# Patient Record
Sex: FEMALE | Race: WHITE | NOT HISPANIC OR LATINO | Employment: FULL TIME | URBAN - METROPOLITAN AREA
[De-identification: names, ages, dates, MRNs, and addresses within clinical notes are randomized per-mention and may not be internally consistent; named-entity substitution may affect disease eponyms.]

---

## 2019-01-23 ENCOUNTER — RECORDS - HEALTHEAST (OUTPATIENT)
Dept: LAB | Facility: CLINIC | Age: 24
End: 2019-01-23

## 2019-01-24 LAB — BACTERIA SPEC CULT: NO GROWTH

## 2019-05-01 ENCOUNTER — RECORDS - HEALTHEAST (OUTPATIENT)
Dept: LAB | Facility: CLINIC | Age: 24
End: 2019-05-01

## 2019-05-02 LAB — C TRACH DNA SPEC QL PROBE+SIG AMP: NEGATIVE

## 2020-11-24 ENCOUNTER — COMMUNICATION - HEALTHEAST (OUTPATIENT)
Dept: TELEHEALTH | Facility: CLINIC | Age: 25
End: 2020-11-24

## 2020-11-24 ENCOUNTER — OFFICE VISIT - HEALTHEAST (OUTPATIENT)
Dept: FAMILY MEDICINE | Facility: CLINIC | Age: 25
End: 2020-11-24

## 2020-11-24 DIAGNOSIS — F41.1 GAD (GENERALIZED ANXIETY DISORDER): ICD-10-CM

## 2020-11-24 DIAGNOSIS — F32.1 MODERATE MAJOR DEPRESSION, SINGLE EPISODE (H): ICD-10-CM

## 2020-11-24 DIAGNOSIS — F51.05 INSOMNIA SECONDARY TO ANXIETY: ICD-10-CM

## 2020-11-24 DIAGNOSIS — R35.0 URINE FREQUENCY: ICD-10-CM

## 2020-11-24 DIAGNOSIS — F41.9 INSOMNIA SECONDARY TO ANXIETY: ICD-10-CM

## 2020-11-24 DIAGNOSIS — Z23 NEEDS FLU SHOT: ICD-10-CM

## 2020-11-24 DIAGNOSIS — K58.2 IRRITABLE BOWEL SYNDROME WITH BOTH CONSTIPATION AND DIARRHEA: ICD-10-CM

## 2020-11-24 DIAGNOSIS — Z00.00 ROUTINE GENERAL MEDICAL EXAMINATION AT A HEALTH CARE FACILITY: ICD-10-CM

## 2020-11-24 DIAGNOSIS — Z13.228 SCREENING FOR METABOLIC DISORDER: ICD-10-CM

## 2020-11-24 DIAGNOSIS — Z12.4 SCREENING FOR CERVICAL CANCER: ICD-10-CM

## 2020-11-24 LAB
ALBUMIN UR-MCNC: NEGATIVE MG/DL
APPEARANCE UR: CLEAR
BACTERIA #/AREA URNS HPF: ABNORMAL HPF
BILIRUB UR QL STRIP: NEGATIVE
CHOLEST SERPL-MCNC: 183 MG/DL
COLOR UR AUTO: YELLOW
FASTING STATUS PATIENT QL REPORTED: YES
GLUCOSE UR STRIP-MCNC: NEGATIVE MG/DL
HBA1C MFR BLD: 5.4 %
HDLC SERPL-MCNC: 51 MG/DL
HGB UR QL STRIP: ABNORMAL
KETONES UR STRIP-MCNC: NEGATIVE MG/DL
LDLC SERPL CALC-MCNC: 106 MG/DL
LEUKOCYTE ESTERASE UR QL STRIP: NEGATIVE
MUCOUS THREADS #/AREA URNS LPF: ABNORMAL LPF
NITRATE UR QL: NEGATIVE
PH UR STRIP: 5.5 [PH] (ref 5–8)
RBC #/AREA URNS AUTO: ABNORMAL HPF
SP GR UR STRIP: >=1.03 (ref 1–1.03)
SQUAMOUS #/AREA URNS AUTO: ABNORMAL LPF
TRIGL SERPL-MCNC: 130 MG/DL
TSH SERPL DL<=0.005 MIU/L-ACNC: 2.88 UIU/ML (ref 0.3–5)
UROBILINOGEN UR STRIP-ACNC: ABNORMAL
WBC #/AREA URNS AUTO: ABNORMAL HPF

## 2020-11-24 RX ORDER — CITALOPRAM HYDROBROMIDE 20 MG/1
20 TABLET ORAL DAILY
Qty: 90 TABLET | Refills: 4 | Status: SHIPPED | OUTPATIENT
Start: 2020-11-24 | End: 2021-08-19

## 2020-11-24 RX ORDER — LEVONORGESTREL AND ETHINYL ESTRADIOL 0.15-0.03
1 KIT ORAL DAILY
Status: SHIPPED | COMMUNITY
Start: 2020-05-15 | End: 2021-07-20

## 2020-11-24 ASSESSMENT — ANXIETY QUESTIONNAIRES
2. NOT BEING ABLE TO STOP OR CONTROL WORRYING: NEARLY EVERY DAY
6. BECOMING EASILY ANNOYED OR IRRITABLE: MORE THAN HALF THE DAYS
1. FEELING NERVOUS, ANXIOUS, OR ON EDGE: NEARLY EVERY DAY
5. BEING SO RESTLESS THAT IT IS HARD TO SIT STILL: NOT AT ALL
IF YOU CHECKED OFF ANY PROBLEMS ON THIS QUESTIONNAIRE, HOW DIFFICULT HAVE THESE PROBLEMS MADE IT FOR YOU TO DO YOUR WORK, TAKE CARE OF THINGS AT HOME, OR GET ALONG WITH OTHER PEOPLE: SOMEWHAT DIFFICULT
GAD7 TOTAL SCORE: 14
4. TROUBLE RELAXING: MORE THAN HALF THE DAYS
7. FEELING AFRAID AS IF SOMETHING AWFUL MIGHT HAPPEN: SEVERAL DAYS
3. WORRYING TOO MUCH ABOUT DIFFERENT THINGS: NEARLY EVERY DAY

## 2020-11-24 ASSESSMENT — MIFFLIN-ST. JEOR: SCORE: 1387.27

## 2020-11-24 ASSESSMENT — PATIENT HEALTH QUESTIONNAIRE - PHQ9: SUM OF ALL RESPONSES TO PHQ QUESTIONS 1-9: 11

## 2020-11-25 LAB
HPV SOURCE: ABNORMAL
HUMAN PAPILLOMA VIRUS 16 DNA: NEGATIVE
HUMAN PAPILLOMA VIRUS 18 DNA: NEGATIVE
HUMAN PAPILLOMA VIRUS FINAL DIAGNOSIS: ABNORMAL
HUMAN PAPILLOMA VIRUS OTHER HR: POSITIVE
SPECIMEN DESCRIPTION: ABNORMAL

## 2020-11-30 ENCOUNTER — COMMUNICATION - HEALTHEAST (OUTPATIENT)
Dept: FAMILY MEDICINE | Facility: CLINIC | Age: 25
End: 2020-11-30

## 2020-12-03 ENCOUNTER — COMMUNICATION - HEALTHEAST (OUTPATIENT)
Dept: FAMILY MEDICINE | Facility: CLINIC | Age: 25
End: 2020-12-03

## 2020-12-03 LAB
BKR LAB AP ABNORMAL BLEEDING: NO
BKR LAB AP BIRTH CONTROL/HORMONES: ABNORMAL
BKR LAB AP CERVICAL APPEARANCE: NORMAL
BKR LAB AP GYN ADEQUACY: ABNORMAL
BKR LAB AP GYN INTERPRETATION: ABNORMAL
BKR LAB AP HPV REFLEX: ABNORMAL
BKR LAB AP LMP: ABNORMAL
BKR LAB AP PATIENT STATUS: ABNORMAL
BKR LAB AP PREVIOUS ABNORMAL: NO
BKR LAB AP PREVIOUS NORMAL: ABNORMAL
HIGH RISK?: NO
INTERPRETING LAB: ABNORMAL
PATH REPORT.COMMENTS IMP SPEC: ABNORMAL
RESULT FLAG (HE HISTORICAL CONVERSION): ABNORMAL

## 2020-12-04 ENCOUNTER — COMMUNICATION - HEALTHEAST (OUTPATIENT)
Dept: INTERNAL MEDICINE | Facility: CLINIC | Age: 25
End: 2020-12-04

## 2020-12-04 DIAGNOSIS — R87.613 HSIL (HIGH GRADE SQUAMOUS INTRAEPITHELIAL LESION) ON PAP SMEAR OF CERVIX: ICD-10-CM

## 2020-12-14 ENCOUNTER — AMBULATORY - HEALTHEAST (OUTPATIENT)
Dept: FAMILY MEDICINE | Facility: CLINIC | Age: 25
End: 2020-12-14

## 2020-12-14 DIAGNOSIS — R87.613 HSIL (HIGH GRADE SQUAMOUS INTRAEPITHELIAL LESION) ON PAP SMEAR OF CERVIX: ICD-10-CM

## 2020-12-15 LAB
LAB AP CHARGES (HE HISTORICAL CONVERSION): NORMAL
PATH REPORT.COMMENTS IMP SPEC: NORMAL
PATH REPORT.COMMENTS IMP SPEC: NORMAL
PATH REPORT.FINAL DX SPEC: NORMAL
PATH REPORT.GROSS SPEC: NORMAL
PATH REPORT.MICROSCOPIC SPEC OTHER STN: NORMAL
PATH REPORT.RELEVANT HX SPEC: NORMAL
RESULT FLAG (HE HISTORICAL CONVERSION): NORMAL

## 2020-12-16 ENCOUNTER — COMMUNICATION - HEALTHEAST (OUTPATIENT)
Dept: FAMILY MEDICINE | Facility: CLINIC | Age: 25
End: 2020-12-16

## 2020-12-17 ENCOUNTER — COMMUNICATION - HEALTHEAST (OUTPATIENT)
Dept: FAMILY MEDICINE | Facility: CLINIC | Age: 25
End: 2020-12-17

## 2020-12-21 ENCOUNTER — COMMUNICATION - HEALTHEAST (OUTPATIENT)
Dept: FAMILY MEDICINE | Facility: CLINIC | Age: 25
End: 2020-12-21

## 2021-01-19 ENCOUNTER — COMMUNICATION - HEALTHEAST (OUTPATIENT)
Dept: FAMILY MEDICINE | Facility: CLINIC | Age: 26
End: 2021-01-19

## 2021-01-19 DIAGNOSIS — F51.05 INSOMNIA SECONDARY TO ANXIETY: ICD-10-CM

## 2021-01-19 DIAGNOSIS — F41.9 INSOMNIA SECONDARY TO ANXIETY: ICD-10-CM

## 2021-01-25 ENCOUNTER — COMMUNICATION - HEALTHEAST (OUTPATIENT)
Dept: OBGYN | Facility: CLINIC | Age: 26
End: 2021-01-25

## 2021-01-25 ENCOUNTER — AMBULATORY - HEALTHEAST (OUTPATIENT)
Dept: OBGYN | Facility: CLINIC | Age: 26
End: 2021-01-25

## 2021-03-02 ENCOUNTER — COMMUNICATION - HEALTHEAST (OUTPATIENT)
Dept: FAMILY MEDICINE | Facility: CLINIC | Age: 26
End: 2021-03-02

## 2021-03-02 DIAGNOSIS — F41.1 GAD (GENERALIZED ANXIETY DISORDER): ICD-10-CM

## 2021-03-10 ENCOUNTER — COMMUNICATION - HEALTHEAST (OUTPATIENT)
Dept: FAMILY MEDICINE | Facility: CLINIC | Age: 26
End: 2021-03-10

## 2021-03-10 DIAGNOSIS — F41.1 GAD (GENERALIZED ANXIETY DISORDER): ICD-10-CM

## 2021-03-10 RX ORDER — BUPROPION HYDROCHLORIDE 150 MG/1
TABLET ORAL
Qty: 90 TABLET | Refills: 2 | Status: SHIPPED | OUTPATIENT
Start: 2021-03-10 | End: 2021-08-24

## 2021-04-07 ENCOUNTER — COMMUNICATION - HEALTHEAST (OUTPATIENT)
Dept: FAMILY MEDICINE | Facility: CLINIC | Age: 26
End: 2021-04-07

## 2021-04-07 DIAGNOSIS — F41.9 INSOMNIA SECONDARY TO ANXIETY: ICD-10-CM

## 2021-04-07 DIAGNOSIS — F51.05 INSOMNIA SECONDARY TO ANXIETY: ICD-10-CM

## 2021-04-08 RX ORDER — TRAZODONE HYDROCHLORIDE 100 MG/1
TABLET ORAL
Qty: 135 TABLET | Refills: 1 | Status: SHIPPED | OUTPATIENT
Start: 2021-04-08 | End: 2021-08-24

## 2021-04-19 ENCOUNTER — OFFICE VISIT - HEALTHEAST (OUTPATIENT)
Dept: FAMILY MEDICINE | Facility: CLINIC | Age: 26
End: 2021-04-19

## 2021-04-19 DIAGNOSIS — F32.1 MODERATE MAJOR DEPRESSION, SINGLE EPISODE (H): ICD-10-CM

## 2021-04-19 DIAGNOSIS — K58.2 IRRITABLE BOWEL SYNDROME WITH BOTH CONSTIPATION AND DIARRHEA: ICD-10-CM

## 2021-04-19 DIAGNOSIS — N32.9 BLADDER PROBLEM: ICD-10-CM

## 2021-04-19 DIAGNOSIS — N39.41 URGENCY INCONTINENCE: ICD-10-CM

## 2021-04-19 DIAGNOSIS — Z98.890 H/O LEEP: ICD-10-CM

## 2021-04-19 LAB
ALBUMIN UR-MCNC: NEGATIVE G/DL
APPEARANCE UR: CLEAR
BILIRUB UR QL STRIP: NEGATIVE
COLOR UR AUTO: YELLOW
GLUCOSE UR STRIP-MCNC: NEGATIVE MG/DL
HGB UR QL STRIP: ABNORMAL
KETONES UR STRIP-MCNC: NEGATIVE MG/DL
LEUKOCYTE ESTERASE UR QL STRIP: NEGATIVE
NITRATE UR QL: NEGATIVE
PH UR STRIP: 6 [PH] (ref 5–8)
SP GR UR STRIP: 1.02 (ref 1–1.03)
UROBILINOGEN UR STRIP-ACNC: ABNORMAL

## 2021-05-27 ASSESSMENT — PATIENT HEALTH QUESTIONNAIRE - PHQ9: SUM OF ALL RESPONSES TO PHQ QUESTIONS 1-9: 11

## 2021-05-28 ASSESSMENT — ANXIETY QUESTIONNAIRES: GAD7 TOTAL SCORE: 14

## 2021-06-05 VITALS
WEIGHT: 135.38 LBS | BODY MASS INDEX: 21.85 KG/M2 | OXYGEN SATURATION: 100 % | DIASTOLIC BLOOD PRESSURE: 80 MMHG | SYSTOLIC BLOOD PRESSURE: 116 MMHG | HEART RATE: 62 BPM

## 2021-06-05 VITALS
HEART RATE: 72 BPM | BODY MASS INDEX: 21.98 KG/M2 | SYSTOLIC BLOOD PRESSURE: 112 MMHG | DIASTOLIC BLOOD PRESSURE: 78 MMHG | WEIGHT: 136.8 LBS | HEIGHT: 66 IN

## 2021-06-05 VITALS
WEIGHT: 138.4 LBS | DIASTOLIC BLOOD PRESSURE: 68 MMHG | SYSTOLIC BLOOD PRESSURE: 108 MMHG | BODY MASS INDEX: 22.34 KG/M2 | HEART RATE: 76 BPM

## 2021-06-10 ENCOUNTER — RECORDS - HEALTHEAST (OUTPATIENT)
Dept: ADMINISTRATIVE | Facility: OTHER | Age: 26
End: 2021-06-10

## 2021-06-10 LAB
ALT SERPL W/O P-5'-P-CCNC: 16 U/L (ref 0–78)
AST SERPL-CCNC: 15 U/L (ref 0–37)
CREAT SERPL-MCNC: 1.06 MG/DL (ref 0.6–1.02)

## 2021-06-13 NOTE — TELEPHONE ENCOUNTER
Pap RN to call pt today to go over results. HSIL, +HR HPV (not 16/18). Colposcopy is recommended.      Palak Ayon RN BSN, Pap Tracking

## 2021-06-13 NOTE — PROGRESS NOTES
Colposcopy Procedure Note    Keely Cox is a 25 y.o. female is here today for a Colposcopy.    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 The prior pap showed no abnormalities.  Prior cervical/vaginal disease: HPV related changes. Prior cervical treatment: no treatment.    Procedure Details   The reason for procedure is explained and informed consent obtained.    Speculum placed in vagina and visualization of cervix achieved, cervix swabbed with 3% acetic acid solution. Cervix is also swabbed with Lugol's solution. Procedure detailsCervix swabbed with Lugol's solution, Endocervical curettage performed, Endometrial biopsy performed, Cervical biopsies taken at 10 and 3 o'clock, Hemostasis achieved with Monsel's solution and Hemostasis achieved with silver nitrate    Findings:  Cervix: acetowhite lesion(s) noted at 3-10 o'clock, punctation noted at  More at 10 O clock, abnormal vessels noted at 10 o'clock, HPV changes noted at 3-10 o'clock and SCJ visualized - lesion at 3-10 o'clock;       Specimens: See orders    Complications: bleeding. Able to stop with silver nitrate    Plan:  Specimens labelled and sent to Pathology. Role of HPV in causing cervical pap smear abnormalities, dysplasia, and cancer is reviewed with the patient. She will be contacted in a few days with biopsy results and recommendations.    Gris Deras

## 2021-06-13 NOTE — PATIENT INSTRUCTIONS - HE
Dear Hope,    Pleasure to meet you today at the clinic visit  As your depression is not well controlled and we are taking over the medication I have few suggestions which might be little more helpful we will continue citalopram at 20 mg dose but added a Wellbutrin 150 mg once daily to start with but can gradually be moved to twice daily dosing you can still use your trazodone and BuSpar as needed  Continue mental health therapy as before  We will follow-up on your labs through my chart as soon as you login  Also we had little confusion about your urine culture result through your urologist if you can find out the name of the pathogen I would like to know and put it in your chart  Continue work with a GI specialist for your irritable bowel syndrome    Gris Deras MD 11/24/2020 10:30 AM

## 2021-06-14 NOTE — TELEPHONE ENCOUNTER
Patient calling requesting refill of her trazodone. Patient last seen 11/24/2020 to Establish care and for a physical.   Refill pended. Please advise.   Ann Mas LPN

## 2021-06-14 NOTE — TELEPHONE ENCOUNTER
1-19-21  Hope called in stated her refill for :  traZODone (DESYREL) 100 MG tablet  Has to go to [facility] not [facility]  can this be called into [facility] instead.  david

## 2021-06-14 NOTE — PROGRESS NOTES
As patient is concerned about the implications of future pregnancy, will keep chart updated for colp/cotest in 1 year. Will also send patient a message stating she can proceed with treatment/LEEP this year if she desires this.    Palak Ayon RN BSN, Pap Tracking

## 2021-06-14 NOTE — PROGRESS NOTES
Hi Dr Deras,    Patient saw you for a colposcopy on 12/14/20 following HSIL pap. Pathology showed LANA I, II, and III. I would like to verify with you that your follow up recommendation is colposcopy with cotesting in 1 year rather than treatment now. ASCCP guidelines recommend LEEP treatment for LANA III. Thank you for clarifying!    Palak Ayon RN BSN, Pap Tracking

## 2021-06-15 NOTE — TELEPHONE ENCOUNTER
Refill Approved    Rx renewed per Medication Renewal Policy. Medication was last renewed on 11/24/20.    [place], [facility] [place] Connection Triage/Med Refill 3/3/2021     Requested Prescriptions   Pending Prescriptions Disp Refills     buPROPion (WELLBUTRIN XL) 150 MG 24 hr tablet [Pharmacy Med Name: BUPROPION XL 150MG TABLETS (24 H)] 30 tablet 2     Sig: TAKE 1 TABLET(150 MG) BY MOUTH EVERY MORNING       Tricyclics/Misc Antidepressant/Antianxiety Meds Refill Protocol Passed - 3/2/2021 12:28 PM        Passed - PCP or prescribing provider visit in last year     Last office visit with prescriber/PCP: 11/24/2020 Gris Deras MD OR same dept: 11/24/2020 Gris Deras MD OR same specialty: 11/24/2020 Gris Dreas MD  Last physical: Visit date not found Last MTM visit: Visit date not found   Next visit within 3 mo: Visit date not found  Next physical within 3 mo: Visit date not found  Prescriber OR PCP: Gris Deras MD  Last diagnosis associated with med order: 1. SUMANTH (generalized anxiety disorder)  - buPROPion (WELLBUTRIN XL) 150 MG 24 hr tablet [Pharmacy Med Name: BUPROPION XL 150MG TABLETS (24 H)]; TAKE 1 TABLET(150 MG) BY MOUTH EVERY MORNING  Dispense: 30 tablet; Refill: 2    If protocol passes may refill for 12 months if within 3 months of last provider visit (or a total of 15 months).

## 2021-06-15 NOTE — TELEPHONE ENCOUNTER
Hope called in stating that she has been out of her wellbutrin for almost a week. She stated she needs her wellbutrin sent into her mail order pharmacy and not [facility] anymore. She would like this asap. Patient last seen 11/24/2020 for annual exam and to establish care.  Ann Mas LPN

## 2021-06-16 PROBLEM — K58.2 IRRITABLE BOWEL SYNDROME WITH BOTH CONSTIPATION AND DIARRHEA: Status: ACTIVE | Noted: 2017-01-05

## 2021-06-16 PROBLEM — Z78.9 VEGETARIAN: Status: ACTIVE | Noted: 2020-11-24

## 2021-06-16 PROBLEM — F32.1 MODERATE MAJOR DEPRESSION, SINGLE EPISODE (H): Status: ACTIVE | Noted: 2020-03-06

## 2021-06-16 PROBLEM — Z79.899 MEDICATION MANAGEMENT: Status: ACTIVE | Noted: 2017-01-05

## 2021-06-16 PROBLEM — Z98.890 H/O LEEP: Status: ACTIVE | Noted: 2021-04-19

## 2021-06-16 PROBLEM — D68.00 VON WILLEBRAND DISEASE (H): Status: ACTIVE | Noted: 2020-03-06

## 2021-06-16 PROBLEM — F41.9 ANXIETY: Status: ACTIVE | Noted: 2020-03-06

## 2021-06-16 PROBLEM — E78.49 OTHER HYPERLIPIDEMIA: Status: ACTIVE | Noted: 2018-05-31

## 2021-06-16 NOTE — PATIENT INSTRUCTIONS - HE
oregano oil mix in water every day with straw.  Ice therpay  Will restart recommend physical therapy /biofeed back    Gris Deras MD 4/19/2021 8:59 AM

## 2021-06-16 NOTE — TELEPHONE ENCOUNTER
Refill Approved    Rx renewed per Medication Renewal Policy. Medication was last renewed on 1/19/21, last OV .11/24/20  [facility] [place] Triage/Med Refill 4/8/2021     Requested Prescriptions   Pending Prescriptions Disp Refills     traZODone (DESYREL) 100 MG tablet [Pharmacy Med Name: TRAZODONE  100MG  TAB] 135 tablet 3     Sig: TAKE 1 TO 1 AND 1/2 TABLETS BY MOUTH AT BEDTIME AS  NEEDED       Tricyclics/Misc Antidepressant/Antianxiety Meds Refill Protocol Passed - 4/7/2021  8:38 PM        Passed - PCP or prescribing provider visit in last year     Last office visit with prescriber/PCP: 11/24/2020 Gris Deras MD OR same dept: 11/24/2020 Gris Deras MD OR same specialty: 11/24/2020 Gris Deras MD  Last physical: Visit date not found Last MTM visit: Visit date not found   Next visit within 3 mo: Visit date not found  Next physical within 3 mo: Visit date not found  Prescriber OR PCP: Gris Deras MD  Last diagnosis associated with med order: 1. Insomnia secondary to anxiety  - traZODone (DESYREL) 100 MG tablet [Pharmacy Med Name: TRAZODONE  100MG  TAB]; TAKE 1 TO 1 AND 1/2 TABLETS BY MOUTH AT BEDTIME AS  NEEDED  Dispense: 135 tablet; Refill: 3    If protocol passes may refill for 12 months if within 3 months of last provider visit (or a total of 15 months).

## 2021-06-16 NOTE — PROGRESS NOTES
Assessment/plan   Keely Cox is a 25 y.o. female who is  establish patient to my practice here with   Chief Complaint   Patient presents with     Abdominal Pain     left lower pain going on for years, feels a heaviness in bladder and makes her urinate often. has gone to other doctors and not one has figured out what is going on        Keely was seen today for abdominal pain.    Diagnoses and all orders for this visit:    Moderate major depression, single episode (H)    Irritable bowel syndrome with both constipation and diarrhea  With discussed in great length mind-body connection which could be related to all the symptoms , help understand that if in anxiety depression not well controlled you continue to have some kind of IBS symptoms with abdominal discomfort and pain.  Work on more finding purpose in , recommend some podcast related to mental health wellbeing one of them on purpose which we discussed today also some natural therapy which can be used to help alleviate some of the abdominal discomfort  Advised to restart physical therapy to get deeper into the problem and understanding so she will have less fear when symptom comes.  Recommend daily fiber supplement also work on FODMAP diet if she can    bladder problem  -     Urinalysis-UC if Indicated    Urgency incontinence  Comments:  already seen GYN/GI/pelvic  .  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  Plan repeat diagnostic Pap smear January 2022          Patient Instructions   oregano oil mix in water every day with straw.  Ice therpay  Will restart recommend physical therapy /biofeed back    Gris  MD [place] 4/19/2021 8:59 AM      Subjective:      HPI: Keely Cox is a 25 y.o. female is here for    IBS/Abdominal Pain.SUMANTH/depression : Patient with known history of depression anxiety currently treated with citalopram and Wellbutrin.  Patient had extensive work-up with the urologist/gynecologist/GI specialist for her symptoms a lot of symptoms are related to her IBS with quite frequent cycling of constipation with diarrhea.  Patient currently work as a  at [employer] quarter, lives with her boyfriend recently had a LEEP procedure done for HGSIL with positive HPV.  Patient biggest concern is always something related to abdominal discomfort today she is more concerned about left lower quadrant discomfort pain had pelvic  work with her and also had a myofascial release which seems to help with her symptoms temporarily.  No change in bowel habits no blood in the urine or stool currently on cycle  I do not have any records of her urologist or gynecologist evaluation looks like she did had a cystoscopy and ultrasound done last year so I will wait on ordering any further lab tests or imaging till we get all the records    I have personally reviewed the patient's allergies, medications, past medical history, family history, social history, rooming notes and problem list in detail and updated the patient record as necessary.      Past Medical History:   Diagnosis Date     HSIL (high grade squamous intraepithelial lesion) on Pap smear of cervix 11/24/2020 6/5/18 NIL pap 11/24/20 HSIL, +HR HPV (not 16/18). Plan: colposcopy due before 2/24/21     No past surgical history on file.  Other environmental allergy, Pollen extracts, and Penicillins  Current Outpatient Medications   Medication Sig Dispense Refill     buPROPion (WELLBUTRIN XL) 150 MG 24 hr tablet TAKE 1 TABLET(150 MG) BY MOUTH EVERY MORNING 90 tablet 2     CHOLECALCIFEROL, VITAMIN D3, ORAL Take by mouth daily. Unknown  dose       citalopram (CELEXA) 20 MG tablet Take 1 tablet (20 mg total) by mouth daily. 90 tablet 4     FA/mv,Ca,iron,min/lycopene/lut (MULTIVITAL ORAL) Take by mouth.       levonorgestrel-ethinyl estradiol (SEASONALE) 0.15 mg-30 mcg (91) per tablet Take 1 tablet by mouth daily.       traZODone (DESYREL) 100 MG tablet TAKE 1 TO 1 AND 1/2 TABLETS BY MOUTH AT BEDTIME AS  NEEDED 135 tablet 1     No current facility-administered medications for this visit.      No family history on file.    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     This note has been dictated using voice recognition software. Any grammatical or context distortions are unintentional and inherent to the software  Gris Deras MD

## 2021-06-18 ENCOUNTER — RECORDS - HEALTHEAST (OUTPATIENT)
Dept: HEALTH INFORMATION MANAGEMENT | Facility: CLINIC | Age: 26
End: 2021-06-18

## 2021-06-18 NOTE — PATIENT INSTRUCTIONS - HE
Patient Instructions by Gris Deras MD at 12/14/2020  9:00 AM     Author: Gris Deras MD Service: -- Author Type: Physician    Filed: 12/14/2020  9:47 AM Encounter Date: 12/14/2020 Status: Signed    : Gris Deras MD (Physician)         Colposcopy   Colposcopy is a procedure that gives your health care provider a magnified view of the cervix. It is done using a lighted microscope called a colposcope. In most cases, a sample of cervical cells is taken during a biopsy. The sample can then be studied in a lab. If any problems are found, you and your health care provider will discuss treatment options. It usually takes less thanÂ 30 minutes, and you can often go back to your normal routine right away.   Reasons for the Procedure   Colposcopy is usually done as a follow-up exam to help find the cause of an abnormal Pap test. Abnormal Pap tests are often due to an HPV (human papilloma virus) infection. HPV is a large family of viruses. HPV can be passed from person to person through sex. HPVÂ can cause genital warts. It can also cause changes in cervical cells. Colposcopy is also used to assess other problems. These include pain or bleeding during sexual intercourse, or a lesion on the vulva or vagina.       What Are the Risks?   Problems after colposcopy are very rare, but can include:   Bleeding (if a biopsy is done)   Infection    Getting Ready for the Procedure   Colposcopy is normally done in your health care providers office. It will be scheduled for a time when youre not having your menstrual period. You may be asked to sign a form giving your consent to have the procedure. A day or two before the procedure, your health care provider may also ask you to:   Avoid sexual intercourse.   Stop using tampons.   Avoid using creams or other vaginal medications.   Avoid douching.   Take over-the-counter pain medications an hour or two before the procedure.    During Colposcopy   You will be asked to lie on an  exam table with your knees bent, just as you do for a Pap test.   An instrument called a speculum is inserted into the vagina to hold it open.   A vinegar solution is applied to the cervix to make the abnormal cells easier to see. You may feel pressure or a slight burning for a few moments. In some cases, the cervix may be numbed first with an anesthetic.   The cervix is viewed through the colposcope, which is placed outside the vagina.   If your health care provider sees abnormal areas on the cervix, a biopsy will be done. The tissue sample is sent to a lab for study.   An endocervical curettage may also be done at the time of colposcopy. In this procedure, an instrument is put into the endocervical canal to get a sample of cells from the endocervix. This area can't be seen with a colposcope.Â   You may feel slight pinching or cramping during the biopsy. Medication may be applied to the biopsy site to stop bleeding.  After the Procedure   If you feel lightheaded or dizzy, you can rest on the table until youre ready to get dressed.   If a biopsy was done, you may have mild cramping or light bleeding for a few days. You may also have discharge from the medication used to stop bleeding at the biopsy site.   Use pads, not tampons, for at least the first 24 hours.   If you have any discomfort, over-the-counter pain medication can provide relief.   Ask your health care provider when you can resume sexual intercourse.  Follow-Up   If a biopsy was done, your health care provider will get the lab report in a week or 2. You and your health care provider can then discuss the results. In some cases, you may be scheduled for further tests or treatment. Be sure to keep follow-up appointments with your health care provider.       Call your health care provider if you have:   Heavy vaginal bleeding (more than a pad an hour for 2 hours).   Severe or increasing pelvic pain.   A fever overÂ 100.4Â FÂ (38Â C)   Foul-smelling or  unusual vaginal discharge.

## 2021-06-18 NOTE — PATIENT INSTRUCTIONS - HE
Patient Instructions by Gris Deras MD at 12/4/2020  8:51 AM     Author: Gris Deras MD Service: -- Author Type: Physician    Filed: 12/4/2020  8:51 AM Encounter Date: 12/3/2020 Status: Addendum    : Gris Deras MD (Physician)    Related Notes: Original Note by Gris Deras MD (Physician) filed at 12/4/2020  8:51 AM         Patient Education     Colposcopy  Colposcopy is a procedure that gives your healthcare provider a magnified view of your cervix. It is done using a lighted microscope called a colposcope. In most cases, a sample of cervical cells is taken during a biopsy. The sample can then be studied in a lab. If any problems are found, you and your healthcare provider will discuss treatment options. It usually takes less than 30 minutes, and you can often go back to your normal routine right away.  Reasons for the procedure  Colposcopy is usually done as a follow-up exam to help find the cause of an abnormal Pap test. Results of an abnormal Pap test can mean that the cells dont appear normal or that there are cancer cells. Abnormal cells can also be caused by infections. HPV (human papillomavirus) is a large family of viruses that can be passed from person to person through sex. HPV can cause genital warts. It can also cause changes in cervical cells. If an HPV test is positive and the Pap test is abnormal, a colposcopy may be recommended. Colposcopy is also used to evaluate other problems. These include pain or bleeding during sex, or a sore (lesion) on the vulva or vagina.  What are the risks?  Problems after colposcopy are very rare, but can include:    Bleeding (if a biopsy is done)    Infection  Getting ready for the procedure  Colposcopy is normally done in your healthcare providers office. It will be scheduled for a time when youre not having your menstrual period. You may be asked to sign a form giving your consent to have the procedure. A day or 2 before the procedure, your healthcare  provider may also ask you to:    Not have sex    Stop using tampons    Not use creams or other vaginal medicines    Not clean out the vagina with water or other fluids (douche)    Take over-the-counter pain medicines an hour or 2 before the procedure  During colposcopy    You will be asked to lie on an exam table with your knees bent, just as you do for a Pap test.    A tool called a speculum is inserted into the vagina to hold it open.    A vinegar solution is applied to the cervix to make the abnormal cells easier to see. You may feel pressure or a slight burning for a few moments. In some cases, the cervix may be numbed first with an anesthetic.    The cervix is looked at through the colposcope, which is placed outside the vagina.    If your healthcare provider sees abnormal areas on your cervix, a biopsy will be done. The tissue sample is sent to a lab for study.    An endocervical curettage may also be done at the time of colposcopy. In this procedure, a tool is put into the endocervical canal to get a sample of cells from the endocervix. This area can't be seen with a colposcope.     You may feel slight pinching or cramping during the biopsy. Medicine may be applied to the biopsy site to stop bleeding.    After the procedure    If you feel lightheaded or dizzy, you can rest on the table until youre ready to get dressed.    If a biopsy were done, you may have mild cramping or light bleeding for a few days. You may also have discharge from the medicine used to stop bleeding at the biopsy site.    Use pads, not tampons, for at least the first 24 hours.    If you have any discomfort, over-the-counter pain medicine can provide relief.    Ask your healthcare provider when you can have sex again.    Follow-up  If a biopsy were done, your healthcare provider will get the lab report in a week or 2. You and your healthcare provider can then discuss the results. In some cases, you may be scheduled for more tests or  treatment. Be sure to keep follow-up appointments with your healthcare provider.  Call your healthcare provider if you have:    Heavy vaginal bleeding (more than a pad an hour for 2 hours)    Severe or increasing pelvic pain    A fever over 100.4 F (38 C), or as directed by your provider    Bad-smelling or unusual vaginal discharge  Date Last Reviewed: 6/1/2017 2000-2019 [employer]. [address] [place] [address]. All rights reserved. This information is not intended as a substitute for professional medical care. Always follow your healthcare professional's instructions.           Patient Education     Colposcopy  Colposcopy is a procedure that gives your healthcare provider a magnified view of your cervix. It is done using a lighted microscope called a colposcope. In most cases, a sample of cervical cells is taken during a biopsy. The sample can then be studied in a lab. If any problems are found, you and your healthcare provider will discuss treatment options. It usually takes less than 30 minutes, and you can often go back to your normal routine right away.  Reasons for the procedure  Colposcopy is usually done as a follow-up exam to help find the cause of an abnormal Pap test. Results of an abnormal Pap test can mean that the cells dont appear normal or that there are cancer cells. Abnormal cells can also be caused by infections. HPV (human papillomavirus) is a large family of viruses that can be passed from person to person through sex. HPV can cause genital warts. It can also cause changes in cervical cells. If an HPV test is positive and the Pap test is abnormal, a colposcopy may be recommended. Colposcopy is also used to evaluate other problems. These include pain or bleeding during sex, or a sore (lesion) on the vulva or vagina.  What are the risks?  Problems after colposcopy are very rare, but can include:    Bleeding (if a biopsy is done)    Infection  Getting ready for the  procedure  Colposcopy is normally done in your healthcare providers office. It will be scheduled for a time when youre not having your menstrual period. You may be asked to sign a form giving your consent to have the procedure. A day or 2 before the procedure, your healthcare provider may also ask you to:    Not have sex    Stop using tampons    Not use creams or other vaginal medicines    Not clean out the vagina with water or other fluids (douche)    Take over-the-counter pain medicines an hour or 2 before the procedure  During colposcopy    You will be asked to lie on an exam table with your knees bent, just as you do for a Pap test.    A tool called a speculum is inserted into the vagina to hold it open.    A vinegar solution is applied to the cervix to make the abnormal cells easier to see. You may feel pressure or a slight burning for a few moments. In some cases, the cervix may be numbed first with an anesthetic.    The cervix is looked at through the colposcope, which is placed outside the vagina.    If your healthcare provider sees abnormal areas on your cervix, a biopsy will be done. The tissue sample is sent to a lab for study.    An endocervical curettage may also be done at the time of colposcopy. In this procedure, a tool is put into the endocervical canal to get a sample of cells from the endocervix. This area can't be seen with a colposcope.     You may feel slight pinching or cramping during the biopsy. Medicine may be applied to the biopsy site to stop bleeding.    After the procedure    If you feel lightheaded or dizzy, you can rest on the table until youre ready to get dressed.    If a biopsy were done, you may have mild cramping or light bleeding for a few days. You may also have discharge from the medicine used to stop bleeding at the biopsy site.    Use pads, not tampons, for at least the first 24 hours.    If you have any discomfort, over-the-counter pain medicine can provide relief.    Ask  your healthcare provider when you can have sex again.    Follow-up  If a biopsy were done, your healthcare provider will get the lab report in a week or 2. You and your healthcare provider can then discuss the results. In some cases, you may be scheduled for more tests or treatment. Be sure to keep follow-up appointments with your healthcare provider.  Call your healthcare provider if you have:    Heavy vaginal bleeding (more than a pad an hour for 2 hours)    Severe or increasing pelvic pain    A fever over 100.4 F (38 C), or as directed by your provider    Bad-smelling or unusual vaginal discharge  Date Last Reviewed: 6/1/2017 2000-2019 [employer]. [address] [place] [address], [place] [address]. All rights reserved. This information is not intended as a substitute for professional medical care. Always follow your healthcare professional's instructions.

## 2021-06-21 NOTE — LETTER
Letter by Gris Deras MD at      Author: Gris Deras MD Service: -- Author Type: --    Filed:  Encounter Date: 11/24/2020 Status: (Other)                    My Depression Action Plan  Name: eKely Cox   Date of Birth 1995  Date: 11/24/2020    My Doctor: Provider, No Primary Care   My Clinic: [id number]  [phone]          GREEN    ZONE   Good Control    What it looks like:     Things are going generally well. You have normal ups and downs. You may even feel depressed from time to time, but bad moods usually last less than a day.   What you need to do:  1. Continue to care for yourself (see self care plan)  2. Check your depression survival kit and update it as needed  3. Follow your physicians recommendations including any medication.  4. Do not stop taking medication unless you consult with your physician first.           YELLOW         ZONE Getting Worse    What it looks like:     Depression is starting to interfere with your life.     It may be hard to get out of bed; you may be starting to isolate yourself from others.    Symptoms of depression are starting to last most all day and this has happened for several days.     You may have suicidal thoughts but they are not constant.   What you need to do:     1. Call your care team. Your response to treatment will improve if you keep your care team informed of your progress. Yellow periods are signs an adjustment may need to be made.     2. Continue your self-care.  Just get dressed and ready for the day.  Don't give yourself time to talk yourself out of it.    3. Talk to someone in your support network.    4. Open up your depression Depression Self-Care Plan / Wellness kit.           RED    ZONE Medical Alert - Get Help    What it looks like:     Depression is seriously interfering with your life.     You may experience these or other symptoms: You cant get out of bed most days,  cant work or engage in other necessary activities, you have trouble taking care of basic hygiene, or basic responsibilities, thoughts of suicide or death that will not go away, self-injurious behavior.     What you need to do:  1. Call your care team and request a same-day appointment. If they are not available (weekends or after hours) call your local crisis line, emergency room or 911.            Self-Care Plan / Wellness Kit    Self-Care for Depression  Heres the deal. Your body and mind are really not as separate as most people think.  What you do and think affects how you feel and how you feel influences what you do and think. This means if you do things that people who feel good do, it will help you feel better.  Sometimes this is all it takes.  There is also a place for medication and therapy depending on how severe your depression is, so be sure to consult with your medical provider and/ or Behavioral Health Consultant if your symptoms are worsening or not improving.     In order to better manage my stress, I will:    Exercise  Get some form of exercise, every day. This will help reduce pain and release endorphins, the feel good chemicals in your brain. This is almost as good as taking antidepressants!  This is not the same as joining a gym and then never going! (they count on that by the way?) It can be as simple as just going for a walk or doing some gardening, anything that will get you moving.      Hygiene   Maintain good hygiene (get out of bed in the morning, make your bed, brush your teeth, take a shower, and get dressed like you were going to work, even if you are unemployed).  If your clothes don't fit try to get ones that do.    Diet  Strive to eat foods that are good for me, drink plenty of water, and avoid excessive sugar, caffeine, alcohol, and other mood-altering substances.  Some foods that are helpful in depression are: complex carbohydrates, B vitamins, flaxseed, fish or fish oil, fresh  fruits and vegetables.    Psychotherapy  Agree to participate in Individual Therapy (if recommended).    Medication  If prescribed medications, I agree to take them.  Missing doses can result in serious side effects.  I understand that drinking alcohol, or other illicit drug use, may cause potential side effects.  I will not stop my medication abruptly without first discussing it with my provider.    Staying Connected With Others  Stay in touch with my friends, family members, and my primary care provider/team.    Use your imagination  Be creative.  We all have a creative side; it doesnt matter if its oil painting, sand castles, or mud pies! This will also kick up the endorphins.    Witness Beauty  (AKA stop and smell the roses) Take a look outside, even in mid-winter. Notice colors, textures. Watch the squirrels and birds.     Service to others  Be of service to others.  There is always someone else in need.  By helping others we can get out of ourselves and remember the really important things.  This also provides opportunities for practicing all the other parts of the program.    Humor  Laugh and be silly!  Adjust your TV habits for less news and crime-drama and more comedy.    Control your stress  Try breathing deep, massage therapy, biofeedback, and meditation. Find time to relax each day.     Crisis Text Line  http://www.crisistextline.org    The Crisis Text Line serves anyone, in any type of crisis, providing access to free, 24/7 support and information via the medium people already use and trust:    Here's how it works:  1.  Text 514-545 from anywhere in the [place], anytime, about any type of crisis.  2.  A live, trained Crisis Counselor receives the text and responds quickly.  3.  The volunteer Crisis Counselor will help you move from a 'hot moment to a cool moment'.  My support system    Clinic Contact:  Phone number:    Contact 1:  Phone number:    Contact 2:  Phone number:    [employer]/:   Phone number:    Therapist:  Phone number:    [facility] crisis center:    Phone number:    Other community support:  Phone number:

## 2021-06-21 NOTE — LETTER
Letter by Gris Deras MD at      Author: Gris Deras MD Service: -- Author Type: --    Filed:  Encounter Date: 12/16/2020 Status: (Other)         Keely Cox  [address] [address]  [address]  [place] [address]             December 16, 2020         Dear Ms. Cox,    Below are the results from your recent visit:    Resulted Orders   Surgical Pathology   Result Value Ref Range    Case Report       Surgical Pathology Report                         Case: [id number]                                    Authorizing Provider:  Gris Deras MD           Collected:           12/14/2020 1007              Ordering Location:     [facility]   Received:            12/14/2020 1007                                     [facility]                                                            Pathologist:           Genaro Truong MD                                                      Specimens:   A) - Endocervical Curettings                                                                        B) - Cervix @ 03:00, @0300 per container                                                            C) - Cervix @ 10:00, @1000 per container                                                   Final Diagnosis       A) ENDOCERVICAL CURETTINGS:        - NORMAL ENDOCERVICAL EPITHELIUM AND STROMA    B) CERVICAL BIOPSY AT 3 O'CLOCK:        - HGSIL (MODERATE TO SEVERE DYSPLASIA, LANA II-III)        - FOCAL KOILOCYTOSIS, CONSISTENT WITH HPV CYTOPATHIC EFFECT        - NEGATIVE FOR INVASIVE MALIGNANCY         - MODERATE ACUTE AND CHRONIC CERVICITIS     C) CERVICAL BIOPSY AT 10 O'CLOCK:        - LGSIL (MILD DYSPLASIA, LANA I) WITH KOILOCYTOSIS, CONSISTENT WITH HPV CYTOPATHIC EFFECT        - NEGATIVE FOR HIGH GRADE EPITHELIAL DYSPLASIA         - MILD TO MODERATE CHRONIC CERVICITIS    Comment       HISTOLOGY-CYTOLOGY CORRELATION:  Cervical biopsy at 3 o'clock correlates with Pap smear [id number].    Microscopic Description        "Microscopic examination performed, substantiating the above diagnosis.    Clinical Information       Clinical history: HGSIL  Reason for procedure: Abnormal Pap    Gross Description       A) The specimen is received in formalin, labeled with the patient's name and \"ECC,\" and consists of a less than 1-gram aggregate of floccular particles of reddish-tan tissue and mucus clinging to an endocervical brush. The tissue is scraped free of the brush, filtered, and totally submitted.    B) The specimen is received in formalin, labeled with the patient's name and \"3:00,\" and consists of two ovoid fragments of soft and translucent gray tissue, each 4 mm in greatest dimension. The tissue is totally submitted.    C) The specimen is received in formalin, labeled with the patient's name and \"10:00,\" and consists of a collection of irregular fragments of translucent pink-tan tissue, individually measuring up to 3 mm in greatest dimension. The specimen is filtered, and all tissue is submitted. JPL:[id number]    Charges CPT: 88108 ×3  ICD-10: N87.1     Result Flag        Comment:      SPECIMEN PROCESSING:    All histology slide preparation and stains; and cytology slide preparation, staining, and cytotechnologist screening done at [facility] are performed at [facility], [address] [place] [address], [facility], with final interpretation, frozen section analysis, and cytology adequacy assessment at indicated laboratory.            It was a pleasure to see you in the office the other day.      I reviewed your recent Colposcopy result showing no advance disease , it did continue to show  low to high grade cell changes , so plan to repeat colp with pap next yr .     Please call with questions or contact us using [employer].    Sincerely,        Electronically signed by Gris Deras MD       "

## 2021-06-27 ENCOUNTER — HEALTH MAINTENANCE LETTER (OUTPATIENT)
Age: 26
End: 2021-06-27

## 2021-06-30 NOTE — PROGRESS NOTES
"Progress Notes by Palak Ayon, RN at 1/25/2021 10:13 AM     Author: Palak Ayon, RN Service: -- Author Type: Registered Nurse    Filed: 1/25/2021 11:32 AM Encounter Date: 1/25/2021 Status: Signed    : Palak Ayon RN (Registered Nurse)       Copied note from provider:    \"  Gris Deras MD Erlandson, Laura C, RN             Now I remember she was planning to get pregnant that's the reason we didn't send her for LEEP     Gris Deras MD 1/25/2021 11:20 AM     \"       "

## 2021-06-30 NOTE — PROGRESS NOTES
Progress Notes by Gris Deras MD at 11/24/2020  9:20 AM     Author: Gris Deras MD Service: -- Author Type: Physician    Filed: 11/24/2020 10:48 AM Encounter Date: 11/24/2020 Status: Signed    : Gris Deras MD (Physician)       FEMALE PREVENTATIVE EXAM    Assessment and Plan:     Patient has been advised of split billing requirements and indicates understanding: Yes    Hope was seen today for establish care, annual exam and medication management.    Routine general medical examination at a health care facility    I discussed the following with the patient:   Adult Healthy Living: Importance of regular exercise  Healthy nutrition  Getting adequate sleep  Stress management  Supplement use  Herbal medications/alternative medical therapies    Screening for metabolic disorder  -     Glycosylated Hemoglobin A1c  -     Lipid Cascade  -     Thyroid Stimulating Hormone (TSH)    Screening for cervical cancer  -     Gynecologic Cytology (PAP Smear)    Needs flu shot  -     Influenza, Seasonal Quad, PF =/> 6months    Moderate major depression, single episode (H)    Urine frequency  -     Urinalysis-UC if Indicated    Irritable bowel syndrome with both constipation and diarrhea  Comments:  currently following GI specialist , finishing coarce of bactrim for 10 days for bacterial overgrowth    SUMANTH (generalized anxiety disorder)  -     buPROPion (WELLBUTRIN XL) 150 MG 24 hr tablet; Take 1 tablet (150 mg total) by mouth every morning.    Patient Instructions   Dear Hope,    Pleasure to meet you today at the clinic visit  As your depression is not well controlled and we are taking over the medication I have few suggestions which might be little more helpful we will continue citalopram at 20 mg dose but added a Wellbutrin 150 mg once daily to start with but can gradually be moved to twice daily dosing you can still use your trazodone and BuSpar as needed  Continue mental health therapy as before  We will follow-up on your  labs through my chart as soon as you login  Also we had little confusion about your urine culture result through your urologist if you can find out the name of the pathogen I would like to know and put it in your chart  Continue work with a GI specialist for your irritable bowel syndrome    Gris Deras MD 11/24/2020 10:30 AM          Next follow up:  1 yr for annual physical    Immunization Reviewed and if needed ordered please see A/P    There are no preventive care reminders to display for this patient.    Immunization History   Administered Date(s) Administered   ? Dtap 1996, 1996, 01/03/1997, 01/05/1998, 08/20/2001   ? HPV Quadrivalent 09/15/2009, 11/05/2009, 02/23/2010   ? Hep B, historic 1996, 04/21/1997, 01/15/1998   ? HiB, historic,unspecified 1996, 1996, 01/13/1997, 11/04/1997   ? INFLUENZA,SEASONAL QUAD, PF, =/> 6months 11/24/2020   ? Influenza, inj, historic,unspecified 09/25/2019   ? Influenza,seasonal,quad inj =/> 6months 01/23/2019   ? MMR 07/23/1997, 08/20/2001   ? Meningococcal MCV4P 09/15/2009   ? POLIO, Unspecified 1996, 1996, 01/13/1997, 01/15/1998, 08/20/2001   ? Tdap 07/31/2007, 10/09/2019   ? Varicella 08/20/2001, 08/19/2008           The following high BMI interventions were performed this visit: dietary management education, guidance, and counseling    I have had an Advance Directives discussion with the patient.    Subjective:   Chief Complaint: Keely Cox is an 25 y.o. female here for a preventative health visit.     HPI: Depression anxiety: With known history of generalized anxiety since very early age and gradually worsening of depression throughout her late teens and early 20s started on medication and just last 2 years after she graduated from college.  Currently taking citalopram 20 mg with trazodone almost 200 mg at bedtime and BuSpar 30 mg twice a day as needed she feels her anxiety is not well controlled at all continue to struggle with  day-to-day anxiousness strong family history of anxiety depression  Also struggling with a lot of mental health somatic issues like irritable bowel syndrome with diarrhea and urinary symptoms per patient she was diagnosed with some form of infection but it was not STD through urologist both her boyfriend and herself were treated with 4 tablets of azithromycin so not sure if it was truly chlamydia or not?  Will let me know.  Patient is following neurologist for her overactive bladder and GI specialist for her irritable bowel syndrome  .  Patient's last menstrual period was 11/17/2020 (exact date).     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 No  Do you typically exercising at least 40 min, 3-4 times per week?  Yes  Do you usually eat at least 4 servings of fruit and vegetables a day, include whole grains and fiber and avoid regularly eating high fat foods? Yes  Have you had an eye exam in the past two years? NO  Do you see a dentist twice per year? NO  Do you have any concerns regarding sleep? YES    Safety Screen  If you own firearms, are they secured in a locked gun cabinet or with trigger locks? Yes    Do you feel you are safe where you are living?: Yes (11/24/2020  9:34 AM)  Do you feel you are safe in your relationship(s)?: Yes (11/24/2020  9:34 AM)      Review of Systems:  Please see above.  The rest of the review of systems are negative for all systems.     Pap History:   Yes - updated in Problem List and Health Maintenance accordingly    Cancer Screening       Status Date      PAP SMEAR Overdue 7/17/2017           Patient Care Team:  Provider, No Primary  "Care as PCP - General      History     Reviewed By Date/Time Sections Reviewed    Malinda Maurice CMA 11/24/2020  9:34 AM Tobacco, Alcohol, Drug Use             Objective:   Vital Signs:   Visit Vitals  /78 (Patient Site: Left Arm, Patient Position: Sitting, Cuff Size: Adult Regular)   Pulse 72   Ht 5' 6\" (1.676 m)   Wt 136 lb 12.8 oz (62.1 kg)   LMP 11/17/2020 (Exact Date)   Breastfeeding No   BMI 22.08 kg/m         PHYSICAL EXAM  Physical Exam:  General Appearance:  Appears comfortable, Alert, cooperative, no distress,   Head: Normocephalic, without obvious abnormality, atraumatic  Eyes: PERRL, conjunctiva/corneas clear, EOM's intact, both eyes             Nose: Nares normal, no drainage   Throat: Lips, mucosa, and tongue normal; teeth and gums normal  Neck: Supple, symmetrical, trachea midline, no adenopathy;                      Lungs: Clear to auscultation bilaterally, respirations unlabored  Pelvic exam: normal external genitalia, vulva, vagina, cervix, uterus and adnexa, PAP: Pap smear done today.  Heart: Regular rate and rhythm, S1 and S2 normal, no murmur, rubs or gallop  Extremities: Extremities normal, atraumatic, no cyanosis or edema  Pulses: DP pulses are 1-2+ bilat.    Skin: no rashes or lesions  Neurologic: normal and equal strength bilat in upper and lower extremities                 Medication List          Accurate as of November 24, 2020 10:37 AM. If you have any questions, ask your nurse or doctor.            START taking these medications    buPROPion 150 MG 24 hr tablet  Also known as: Wellbutrin XL  INSTRUCTIONS: Take 1 tablet (150 mg total) by mouth every morning.  Started by: Gris Deras MD           CHANGE how you take these medications    citalopram 20 MG tablet  Also known as: celeXA  INSTRUCTIONS: Take 1 tablet (20 mg total) by mouth daily.  What changed: how much to take  Changed by: Gris Deras MD           CONTINUE taking these medications    busPIRone 30 MG tablet  Also " known as: BUSPAR  INSTRUCTIONS: Take 1 tablet (30 mg total) by mouth 2 (two) times a day.        CHOLECALCIFEROL (VITAMIN D3) ORAL  INSTRUCTIONS: Take by mouth daily. Unknown dose        levonorgestrel-ethinyl estradiol 0.15 mg-30 mcg (91) per tablet  Also known as: SEASONALE  INSTRUCTIONS: Take 1 tablet by mouth daily.        traZODone 100 MG tablet  Also known as: DESYREL  INSTRUCTIONS: Take 1-1.5 tablets (100-150 mg total) by mouth at bedtime as needed.           STOP taking these medications    sulfamethoxazole-trimethoprim 800-160 mg per tablet  Also known as: SEPTRA DS  Stopped by: Gris Deras MD           Where to Get Your Medications      These [id number] [place], [place] - [address] AT [facility] [address] [facility]  [address], [facility] [address]    Hours: 24-hours Phone: [phone]     buPROPion 150 MG 24 hr tablet    busPIRone 30 MG tablet    citalopram 20 MG tablet    traZODone 100 MG tablet         Additional Screenings Completed Today:

## 2021-06-30 NOTE — PROGRESS NOTES
"Progress Notes by Palak Ayon, RN at 1/25/2021 10:13 AM     Author: Palak Ayon, RN Service: -- Author Type: Registered Nurse    Filed: 1/25/2021 10:55 AM Encounter Date: 1/25/2021 Status: Signed    : Palak Ayon, RN (Registered Nurse)       Copied note from provider:    \"  Gris Deras MD  You 16 minutes ago (10:25 AM)     I was planning to do colp with pap next yr if persistent will recommend LEEP, but OK with LEEP this year also     Gris Deras MD 1/25/2021 10:25 AM      Message text    \"       "

## 2021-07-20 ENCOUNTER — OFFICE VISIT (OUTPATIENT)
Dept: FAMILY MEDICINE | Facility: CLINIC | Age: 26
End: 2021-07-20
Payer: COMMERCIAL

## 2021-07-20 VITALS
SYSTOLIC BLOOD PRESSURE: 98 MMHG | WEIGHT: 134.6 LBS | DIASTOLIC BLOOD PRESSURE: 70 MMHG | BODY MASS INDEX: 21.63 KG/M2 | HEIGHT: 66 IN | OXYGEN SATURATION: 98 % | HEART RATE: 90 BPM

## 2021-07-20 DIAGNOSIS — Z30.09 GENERAL COUNSELING FOR PRESCRIPTION OF ORAL CONTRACEPTIVES: ICD-10-CM

## 2021-07-20 DIAGNOSIS — F32.1 MODERATE MAJOR DEPRESSION, SINGLE EPISODE (H): Primary | ICD-10-CM

## 2021-07-20 DIAGNOSIS — Z79.899 MEDICATION MANAGEMENT: ICD-10-CM

## 2021-07-20 DIAGNOSIS — L70.0 ACNE VULGARIS: ICD-10-CM

## 2021-07-20 PROCEDURE — 99214 OFFICE O/P EST MOD 30 MIN: CPT | Performed by: FAMILY MEDICINE

## 2021-07-20 RX ORDER — DESOGESTREL AND ETHINYL ESTRADIOL 0.15-0.03
1 KIT ORAL DAILY
Qty: 84 TABLET | Refills: 3 | Status: SHIPPED | OUTPATIENT
Start: 2021-07-20 | End: 2021-08-24

## 2021-07-20 RX ORDER — SPIRONOLACTONE 50 MG/1
50 TABLET, FILM COATED ORAL DAILY
COMMUNITY
End: 2022-02-07

## 2021-07-20 ASSESSMENT — MIFFLIN-ST. JEOR: SCORE: 1372.29

## 2021-07-20 NOTE — PROGRESS NOTES
Assessment/plan   Keely Cox is a 25 y.o. female who is  establish patient to my practice here with     Patient presents with:  hormone check: acne and hair growth      Keely was seen today for abdominal pain.    Diagnoses and all orders for this visit:  Keely was seen today for hormone check.    Diagnoses and all orders for this visit:    Moderate major depression, single episode (H)  Comments:  feeling more fatigue for last one yr .  Overall medications are working well for her  Advised on plenty of hydration.  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  Maybe teach every other day to have recovery time for the body  Orders:  -     REVIEW OF HEALTH MAINTENANCE PROTOCOL ORDERS    Acne vulgaris  Patient started on spironolactone 2 days ago to help with her hormonal acne and hirsutism mostly on the chin.  Patient has habit of picking hair on her chin and sometimes causing a lot of scarring from it  We will also change her birth control pills to help with the hormonal acne  -     desogestrel-ethinyl estradiol (APRI) 0.15-30 MG-MCG tablet; Take 1 tablet by mouth daily    Medication management  Side effect of spironolactone discussed with the patient, reassure the patient there is no specific lab we can do to figure out hormonal acne as most of the hormones fluctuate quite a lot throughout the day we can always do test for testosterone level and prolactin estrogen ,progesterone, FSH, LH if needed.  She can change coming.    General counseling for prescription of oral contraceptives  -     desogestrel-ethinyl estradiol (APRI) 0.15-30 MG-MCG tablet; Take 1 tablet by mouth daily        Subjective:      HPI: Keely Cox is a 25 y.o. female is here for    IBS/Abdominal Pain.SUMANTH/depression : Since last history and physical patient had seen gastroenterologist and plan to have colonoscopy done soon to figure out the cause for her left lower quadrant pain  Patient with known history of depression anxiety  currently treated with citalopram and Wellbutrin.  Patient had extensive work-up with the urologist/gynecologist/GI specialist for her symptoms a lot of symptoms are related to her IBS with quite frequent cycling of constipation with diarrhea.  Patient currently work as a  at [employer], lives with her boyfriend recently had a LEEP procedure done for HGSIL with positive HPV.  Patient biggest concern is always something related to abdominal discomfort today she is more concerned about left lower quadrant discomfort pain had pelvic  work with her and also had a myofascial release which seems to help with her symptoms temporarily.  No change in bowel habits no blood in the urine or stool currently on cycle  I do not have any records of her urologist or gynecologist evaluation looks like she did had a cystoscopy and ultrasound done last year so I will wait on ordering any further lab tests or imaging till we get all the records    I have personally reviewed the patient's allergies, medications, past medical history, family history, social history, rooming notes and problem list in detail and updated the patient record as necessary.      Past Medical History:   Diagnosis Date     HSIL (high grade squamous intraepithelial lesion) on Pap smear of cervix 11/24/2020 6/5/18 NIL pap 11/24/20 HSIL, +HR HPV (not 16/18). Plan: colposcopy due before 2/24/21     No past surgical history on file.  Other environmental allergy, Pollen extracts, and Penicillins  Current Outpatient Medications   Medication Sig Dispense Refill     buPROPion (WELLBUTRIN XL) 150 MG 24 hr tablet TAKE 1 TABLET(150 MG) BY MOUTH EVERY MORNING 90 tablet 2     CHOLECALCIFEROL, VITAMIN D3, ORAL Take by mouth daily. Unknown dose       citalopram (CELEXA) 20 MG tablet Take 1 tablet (20 mg total) by mouth daily. 90 tablet 4     FA/mv,Ca,iron,min/lycopene/lut (MULTIVITAL ORAL) Take by mouth.        levonorgestrel-ethinyl estradiol (SEASONALE) 0.15 mg-30 mcg (91) per tablet Take 1 tablet by mouth daily.       traZODone (DESYREL) 100 MG tablet TAKE 1 TO 1 AND 1/2 TABLETS BY MOUTH AT BEDTIME AS  NEEDED 135 tablet 1     No current facility-administered medications for this visit.      No family history on file.    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     This note has been dictated using voice recognition software. Any grammatical or context distortions are unintentional and inherent to the software  Gris Deras MD

## 2021-07-27 ENCOUNTER — TELEPHONE (OUTPATIENT)
Dept: FAMILY MEDICINE | Facility: CLINIC | Age: 26
End: 2021-07-27

## 2021-07-27 DIAGNOSIS — D68.00 VON WILLEBRAND DISEASE (H): Primary | ICD-10-CM

## 2021-07-27 NOTE — TELEPHONE ENCOUNTER
What procedure she is going for ??  Wondering if she need preop  I will not be able to clear her without the lab result  Gris Deras MD 7/27/2021 9:47 AM   [facility].  [phone]

## 2021-07-27 NOTE — TELEPHONE ENCOUNTER
Took place for INR and PT  Let patient have the lab result done if they are normal she will be cleared for surgery    Gris Deras MD 7/27/2021 10:33 AM   [facility].  [phone]

## 2021-07-27 NOTE — TELEPHONE ENCOUNTER
Patient has a bleeding disorder.  they need a pre procedure clearance   and if there are any precautions for the blooding disorder. Provider ordered additional testing after consult.    Valente form [facility],   Verbal directions ok   Please call valente directly [phone] ext. [phone] detailed message ok

## 2021-07-27 NOTE — TELEPHONE ENCOUNTER
She is having an endoscopy and colonoscopy. She said she has van willebrand's so they need clearance to schedule. Did not say she needed a pre op.

## 2021-07-29 NOTE — TELEPHONE ENCOUNTER
Keely has her lab work on 8/3/21 scheduled once we have results and clearance for procedure please call Valente with results and clearance at [facility].  JULIETH DIAZ on 7/29/2021 at 4:06 PM

## 2021-07-29 NOTE — TELEPHONE ENCOUNTER
Valente @ [facility] called and stated she would like someone to call her back and verbally confirm when & where the Pt's last INR was done.    NUVIA ZHOU I

## 2021-08-04 ENCOUNTER — LAB (OUTPATIENT)
Dept: LAB | Facility: CLINIC | Age: 26
End: 2021-08-04
Payer: COMMERCIAL

## 2021-08-04 DIAGNOSIS — D68.00 VON WILLEBRAND DISEASE (H): ICD-10-CM

## 2021-08-04 LAB
INR PPP: 0.96 (ref 0.85–1.15)
PROTHROMBIN TIME: 12.9 SECONDS

## 2021-08-04 PROCEDURE — 36415 COLL VENOUS BLD VENIPUNCTURE: CPT

## 2021-08-04 PROCEDURE — 85610 PROTHROMBIN TIME: CPT

## 2021-08-11 NOTE — TELEPHONE ENCOUNTER
Please pass the medicine to [facility] that both her PT/INR in good range and she is cleared for the surgery    Gris Deras MD 8/11/2021 10:25 AM   [facility].  [phone]

## 2021-08-11 NOTE — TELEPHONE ENCOUNTER
Left detailed message on Valente's personal voicemail relaying provider note and call back number if any further questions.

## 2021-08-19 ENCOUNTER — TELEPHONE (OUTPATIENT)
Dept: FAMILY MEDICINE | Facility: CLINIC | Age: 26
End: 2021-08-19

## 2021-08-19 DIAGNOSIS — F32.1 MODERATE MAJOR DEPRESSION, SINGLE EPISODE (H): ICD-10-CM

## 2021-08-19 RX ORDER — CITALOPRAM HYDROBROMIDE 20 MG/1
20 TABLET ORAL DAILY
Qty: 90 TABLET | Refills: 4 | Status: SHIPPED | OUTPATIENT
Start: 2021-08-19 | End: 2021-08-24

## 2021-08-19 NOTE — TELEPHONE ENCOUNTER
8-19-21  Reason for Call:  medication refill:    Do you use a [facility]?  [facility]    in [place]  Name of the medication requested: Citalopram 20 mg    Other request: pt called stated she originally was getting Citalopram from DR Poly Rojas  At [facility] used to prescribe this and now Dr Rojas  Has left [facility] and cant prescribe it anymore and pt needs refill .  Pt uses pharmacy: [facility] in [place]  Pt declined appt offer, pt stated Dr Deras is aware she is taking this  Med     Can we leave a detailed message on this number? YES    Phone number patient can be reached at: Cell number on file:    Telephone Information:   Mobile [phone]       Best Time: anytime    Call taken on 8/19/2021 at 1:57 PM by Rema Lombardi

## 2021-08-24 DIAGNOSIS — Z30.09 GENERAL COUNSELING FOR PRESCRIPTION OF ORAL CONTRACEPTIVES: ICD-10-CM

## 2021-08-24 DIAGNOSIS — F41.9 INSOMNIA SECONDARY TO ANXIETY: ICD-10-CM

## 2021-08-24 DIAGNOSIS — F41.1 GAD (GENERALIZED ANXIETY DISORDER): ICD-10-CM

## 2021-08-24 DIAGNOSIS — F51.05 INSOMNIA SECONDARY TO ANXIETY: ICD-10-CM

## 2021-08-24 DIAGNOSIS — F32.1 MODERATE MAJOR DEPRESSION, SINGLE EPISODE (H): ICD-10-CM

## 2021-08-24 DIAGNOSIS — L70.0 ACNE VULGARIS: ICD-10-CM

## 2021-08-24 RX ORDER — BUPROPION HYDROCHLORIDE 150 MG/1
TABLET ORAL
Qty: 90 TABLET | Refills: 2 | Status: SHIPPED | OUTPATIENT
Start: 2021-08-24 | End: 2022-05-19

## 2021-08-24 RX ORDER — CITALOPRAM HYDROBROMIDE 20 MG/1
20 TABLET ORAL DAILY
Qty: 90 TABLET | Refills: 4 | Status: SHIPPED | OUTPATIENT
Start: 2021-08-24 | End: 2022-08-31

## 2021-08-24 RX ORDER — DESOGESTREL AND ETHINYL ESTRADIOL 0.15-0.03
1 KIT ORAL DAILY
Qty: 84 TABLET | Refills: 3 | Status: SHIPPED | OUTPATIENT
Start: 2021-08-24 | End: 2021-08-29 | Stop reason: SINTOL

## 2021-08-24 RX ORDER — TRAZODONE HYDROCHLORIDE 100 MG/1
TABLET ORAL
Qty: 135 TABLET | Refills: 1 | Status: SHIPPED | OUTPATIENT
Start: 2021-08-24 | End: 2022-02-01

## 2021-08-24 NOTE — TELEPHONE ENCOUNTER
Patient states that she needs the prescriptions below sent to [facility] otherwise it is not covered by insurance.  She states that her prescriptions were sent to [facility]    buPROPion (WELLBUTRIN XL) 150 MG 24 hr tablet  Last filled: 3/10/2021    traZODone (DESYREL) 100 MG tablet  Last filled: 4/08/2021    desogestrel-ethinyl estradiol (APRI) 0.15-30 MG-MCG tablet  Last filled: 8/19/2021    citalopram (CELEXA) 20 MG tablet  Last filled: 8/19/2021    Last OV: 7/20/2021    Lisa Glass

## 2021-08-26 ENCOUNTER — TELEPHONE (OUTPATIENT)
Dept: FAMILY MEDICINE | Facility: CLINIC | Age: 26
End: 2021-08-26

## 2021-08-26 DIAGNOSIS — Z30.09 GENERAL COUNSELING FOR PRESCRIPTION OF ORAL CONTRACEPTIVES: Primary | ICD-10-CM

## 2021-08-26 NOTE — TELEPHONE ENCOUNTER
8-26-21  Reason for Call:   Prescription Birth Control    Detailed comments: pt called stated she doesn't want to take: desogestrel-ethinyl estradiol anymore pt wants to take her old birthcontrol levonorgestrel-ethinyl estradiol  Pt stated she started the old birthcontrol 8-25-21.  Per pt she stated the providers office needs to cancel out her old birthcontrol auto renewal for desogestrel-ethinyl estradiol we [facility] [phone] if its not cancelled they will continue to send pt the refills     Phone Number Patient can be reached at: Cell number on file:    Telephone Information:   Mobile [phone]       Best Time: anytime    Can we leave a detailed message on this number? YES    Call taken on 8/26/2021 at 2:01 PM by Rema Lombardi

## 2021-08-27 RX ORDER — BUPROPION HYDROCHLORIDE 150 MG/1
TABLET ORAL
Qty: 90 TABLET | Refills: 3 | OUTPATIENT
Start: 2021-08-27

## 2021-08-27 RX ORDER — TRAZODONE HYDROCHLORIDE 100 MG/1
TABLET ORAL
Qty: 135 TABLET | Refills: 3 | OUTPATIENT
Start: 2021-08-27

## 2021-08-27 NOTE — TELEPHONE ENCOUNTER
"Too soon to refill.  Likely duplicate requests.    Copy of most recent Rxs:        Last office visit provider:  7/20/21     Requested Prescriptions   Pending Prescriptions Disp Refills     buPROPion (WELLBUTRIN XL) 150 MG 24 hr tablet [Pharmacy Med Name: buPROPion HCl ER (XL) 150 MG Oral Tablet Extended Release 24 Hour] 90 tablet 3     Sig: TAKE 1 TABLET BY MOUTH IN  THE MORNING       SSRIs Protocol Passed - 8/24/2021 12:07 PM        Passed - Recent (12 mo) or future (30 days) visit within the authorizing provider's specialty     Patient has had an office visit with the authorizing provider or a provider within the authorizing providers department within the previous 12 mos or has a future within next 30 days. See \"Patient Info\" tab in inbasket, or \"Choose Columns\" in Meds & Orders section of the refill encounter.              Passed - Medication is Bupropion     If the medication is Bupropion (Wellbutrin), and the patient is taking for smoking cessation; OK to refill.          Passed - Medication is active on med list        Passed - Patient is age 18 or older        Passed - No active pregnancy on record        Passed - No positive pregnancy test in last 12 months           traZODone (DESYREL) 100 MG tablet [Pharmacy Med Name: TRAZODONE  100MG  TAB] 135 tablet 3     Sig: TAKE 1 TO 1 AND 1/2 TABLETS BY MOUTH AT BEDTIME AS  NEEDED       Serotonin Modulators Passed - 8/24/2021 12:07 PM        Passed - Recent (12 mo) or future (30 days) visit within the authorizing provider's specialty     Patient has had an office visit with the authorizing provider or a provider within the authorizing providers department within the previous 12 mos or has a future within next 30 days. See \"Patient Info\" tab in inbasket, or \"Choose Columns\" in Meds & Orders section of the refill encounter.              Passed - Medication is active on med list        Passed - Patient is age 18 or older        Passed - No active pregnancy on record    "     Passed - No positive pregnancy test in past 12 months             Glo Cisneros RN 08/27/21 6:29 PM

## 2021-08-27 NOTE — TELEPHONE ENCOUNTER
Tried calling pharmacy with number listed.They were not able to help me with cancelling rx and when transferred the line was disconnected.     LM for pt to see if there is another number listed.

## 2021-08-29 RX ORDER — LEVONORGESTREL/ETHIN.ESTRADIOL 0.1-0.02MG
1 TABLET ORAL DAILY
Qty: 84 TABLET | Refills: 4 | Status: SHIPPED | OUTPATIENT
Start: 2021-08-29 | End: 2022-09-25

## 2021-08-29 NOTE — TELEPHONE ENCOUNTER
A prescription changed and send [facility]    Gris Deras MD 8/29/2021 9:29 AM   [facility].  [phone]

## 2021-10-08 ENCOUNTER — TELEPHONE (OUTPATIENT)
Dept: FAMILY MEDICINE | Facility: CLINIC | Age: 26
End: 2021-10-08

## 2021-10-08 DIAGNOSIS — F41.9 INSOMNIA SECONDARY TO ANXIETY: Primary | ICD-10-CM

## 2021-10-08 DIAGNOSIS — F51.05 INSOMNIA SECONDARY TO ANXIETY: Primary | ICD-10-CM

## 2021-10-08 NOTE — TELEPHONE ENCOUNTER
Reason for Call:  Medication or medication refill:    Do you use a [facility]?  Name of the pharmacy and phone number for the current request:  [facility]    Name of the medication requested: traZODone (DESYREL) 100 MG tablet    Other request: Patient states that she has been taking 2 tablets and has to go a couple days without before she refills this.  She is wondering if dose can be updated to reflect taking 2 tablets.      Can we leave a detailed message on this number? YES    Phone number patient can be reached at: Cell number on file:    Telephone Information:   Mobile [phone]       Best Time: anytime    Call taken on 10/8/2021 at 10:16 AM by Lisa Glass

## 2021-10-08 NOTE — TELEPHONE ENCOUNTER
Medication not prepped for Trazodone 100 mg; current sig says to take 1 tablet to 1 1/2 tablet prn     Patient is requesting directions to be changed to what she is taking 2 tablets at bedtime.    Last refill: 8/24/21  Last ov: 7/20/21    Please advise and send [facility] if ok.    Viral ESCOBAR, CMA

## 2021-10-09 RX ORDER — TRAZODONE HYDROCHLORIDE 100 MG/1
200 TABLET ORAL AT BEDTIME
Qty: 180 TABLET | Refills: 3 | Status: SHIPPED | OUTPATIENT
Start: 2021-10-09 | End: 2022-09-30

## 2021-10-17 ENCOUNTER — HEALTH MAINTENANCE LETTER (OUTPATIENT)
Age: 26
End: 2021-10-17

## 2021-11-30 ENCOUNTER — PATIENT OUTREACH (OUTPATIENT)
Dept: FAMILY MEDICINE | Facility: CLINIC | Age: 26
End: 2021-11-30
Payer: COMMERCIAL

## 2021-11-30 DIAGNOSIS — R87.613 HSIL (HIGH GRADE SQUAMOUS INTRAEPITHELIAL LESION) ON PAP SMEAR OF CERVIX: ICD-10-CM

## 2021-11-30 NOTE — LETTER
November 30, 2021      Keely Cox  [address] [place] [address] [address] [place] [id number]        Dear ,    At [facility], your health and wellness are our primary concern. That is why we are following up on your most recent Colposcopy.    Please call [phone] to schedule an appointment for your recommended follow-up Pap smear and Human Papillomavirus (HPV) test and Colposcopy (this cannot be scheduled through [facility]) at your earliest convenience.     If you have completed the appointment outside of the [facility] system, please have the records forwarded to our office. We will update your chart for your provider to review before your next annual wellness visit.     Thank you for choosing [facility]!      Sincerely,    Your [facility] Care Team

## 2021-12-30 NOTE — TELEPHONE ENCOUNTER
Patient due for Pap and HPV and Colposcopy.    Reminder done today via telephone call.    Left message

## 2022-01-27 DIAGNOSIS — Z11.59 ENCOUNTER FOR SCREENING FOR OTHER VIRAL DISEASES: Primary | ICD-10-CM

## 2022-01-28 PROBLEM — R87.613 HSIL (HIGH GRADE SQUAMOUS INTRAEPITHELIAL LESION) ON PAP SMEAR OF CERVIX: Status: ACTIVE | Noted: 2020-11-24

## 2022-02-01 ENCOUNTER — OFFICE VISIT (OUTPATIENT)
Dept: FAMILY MEDICINE | Facility: CLINIC | Age: 27
End: 2022-02-01
Payer: COMMERCIAL

## 2022-02-01 VITALS
WEIGHT: 136 LBS | BODY MASS INDEX: 21.86 KG/M2 | HEIGHT: 66 IN | SYSTOLIC BLOOD PRESSURE: 110 MMHG | DIASTOLIC BLOOD PRESSURE: 70 MMHG | HEART RATE: 66 BPM | OXYGEN SATURATION: 98 %

## 2022-02-01 DIAGNOSIS — R10.13 ABDOMINAL PAIN, EPIGASTRIC: ICD-10-CM

## 2022-02-01 DIAGNOSIS — D68.00 VON WILLEBRAND DISEASE (H): ICD-10-CM

## 2022-02-01 DIAGNOSIS — Z01.818 PREOPERATIVE EXAMINATION: Primary | ICD-10-CM

## 2022-02-01 DIAGNOSIS — F32.1 MODERATE MAJOR DEPRESSION, SINGLE EPISODE (H): ICD-10-CM

## 2022-02-01 DIAGNOSIS — R87.613 HSIL (HIGH GRADE SQUAMOUS INTRAEPITHELIAL LESION) ON PAP SMEAR OF CERVIX: ICD-10-CM

## 2022-02-01 LAB
ERYTHROCYTE [DISTWIDTH] IN BLOOD BY AUTOMATED COUNT: 12.6 % (ref 10–15)
HCT VFR BLD AUTO: 40.7 % (ref 35–47)
HGB BLD-MCNC: 13.3 G/DL (ref 11.7–15.7)
INR PPP: 0.91 (ref 0.85–1.15)
MCH RBC QN AUTO: 27.9 PG (ref 26.5–33)
MCHC RBC AUTO-ENTMCNC: 32.7 G/DL (ref 31.5–36.5)
MCV RBC AUTO: 85 FL (ref 78–100)
PLATELET # BLD AUTO: 252 10E3/UL (ref 150–450)
RBC # BLD AUTO: 4.77 10E6/UL (ref 3.8–5.2)
WBC # BLD AUTO: 6.5 10E3/UL (ref 4–11)

## 2022-02-01 PROCEDURE — 99214 OFFICE O/P EST MOD 30 MIN: CPT | Performed by: NURSE PRACTITIONER

## 2022-02-01 PROCEDURE — 85610 PROTHROMBIN TIME: CPT | Performed by: NURSE PRACTITIONER

## 2022-02-01 PROCEDURE — 36415 COLL VENOUS BLD VENIPUNCTURE: CPT | Performed by: NURSE PRACTITIONER

## 2022-02-01 PROCEDURE — 85027 COMPLETE CBC AUTOMATED: CPT | Performed by: NURSE PRACTITIONER

## 2022-02-01 PROCEDURE — 36416 COLLJ CAPILLARY BLOOD SPEC: CPT | Performed by: NURSE PRACTITIONER

## 2022-02-01 ASSESSMENT — MIFFLIN-ST. JEOR: SCORE: 1378.64

## 2022-02-01 NOTE — PROGRESS NOTES
[facility]  [address] [facility] [address]  Phone: [phone]  Fax: [phone]  Primary Provider: Gris Deras  Pre-op Performing Provider: ISH CARRERO    PREOPERATIVE EVALUATION:  Today's date: 2/1/2022    Keely Cox is a 26 year old female who presents for a preoperative evaluation.    Surgical Information:  Surgery/Procedure: ESOPHAGOGASTRODUODENOSCOPY WITH COLONOSCOPY  Surgery Location: [facility]  Surgeon: Matt Josue MD  Surgery Date: 02/08/2022  Time of Surgery: 9:05 AM  Where patient plans to recover: At home with family  Fax number for surgical facility: Note does not need to be faxed, will be available electronically in Epic.    Type of Anesthesia Anticipated: to be determined    Assessment & Plan     The proposed surgical procedure is considered LOW risk.    Preoperative examination  No contraindications for planned procedure.  Patient is on oral contraception and is currently menstruating and declined urine pregnancy test today.  She may need to have a UPT on the day of her procedure    Abdominal pain, epigastric  Planned EGD and colonoscopy for chronic IBS symptoms    Von Willebrand disease (H)  In review of the literature, hemostatic prophylaxis may be withheld for patients with mild bleeding disorders who undergo colonoscopy with low likelihood of requiring intervention.  I will check an INR and CBC today.  The patient is having this procedure in the hospital.      Moderate major depression, single episode (H)  Stable    Acne  She is not using her spironolactone.      Patient Instructions   Hold all supplements, aspirin and NSAIDs for 7 days prior to surgery.     Follow your surgeon's direction on when to stop eating and drinking prior to surgery.    Your surgeon will be managing your pain after your surgery.      Remove all jewelry and metal piercings before your surgery.     Remove nail polish from fingers before  surgery.    If you use a CPAP machine, bring this with you to surgery.           Risks and Recommendations:  The patient has the following additional risks and recommendations for perioperative complications:   - No identified additional risk factors other than previously addressed    Medication Instructions:  As above    RECOMMENDATION:  APPROVAL GIVEN to proceed with proposed procedure, without further diagnostic evaluation.    18 minutes spent on the date of the encounter doing chart review, history and exam, documentation and further activities per the note      Subjective     HPI related to upcoming procedure: As above      Preop Questions 2/1/2022   1. Have you ever had a heart attack or stroke? No   2. Have you ever had surgery on your heart or blood vessels, such as a stent placement, a coronary artery bypass, or surgery on an artery in your head, neck, heart, or legs? No   3. Do you have chest pain with activity? No   4. Do you have a history of  heart failure? No   5. Do you currently have a cold, bronchitis or symptoms of other infection? No   6. Do you have a cough, shortness of breath, or wheezing? No   7. Do you or anyone in your family have previous history of blood clots? No   8. Do you or does anyone in your family have a serious bleeding problem such as prolonged bleeding following surgeries or cuts? YES - Von willebrand   9. Have you ever had problems with anemia or been told to take iron pills? No   10. Have you had any abnormal blood loss such as black, tarry or bloody stools, or abnormal vaginal bleeding? No   11. Have you ever had a blood transfusion? No   12. Are you willing to have a blood transfusion if it is medically needed before, during, or after your surgery? Yes   13. Have you or any of your relatives ever had problems with anesthesia? No   14. Do you have sleep apnea, excessive snoring or daytime drowsiness? No   15. Do you have any artifical heart valves or other implanted medical  devices like a pacemaker, defibrillator, or continuous glucose monitor? No   16. Do you have artificial joints? No   17. Are you allergic to latex? No   18. Is there any chance that you may be pregnant? No     Health Care Directive:  Patient does not have a Health Care Directive or Living Will: Discussed advance care planning with patient; however, patient declined at this time.    Preoperative Review of :   reviewed - no record of controlled substances prescribed.      Status of Chronic Conditions:  See problem list for active medical problems.  Problems all longstanding and stable, except as noted/documented.  See ROS for pertinent symptoms related to these conditions.      Review of Systems  CONSTITUTIONAL: NEGATIVE for fever, chills, change in weight  INTEGUMENTARY/SKIN: NEGATIVE for worrisome rashes, moles or lesions  EYES: NEGATIVE for vision changes or irritation  ENT/MOUTH: NEGATIVE for ear, mouth and throat problems  RESP: NEGATIVE for significant cough or SOB  CV: NEGATIVE for chest pain, palpitations or peripheral edema  GI: NEGATIVE for nausea, abdominal pain, heartburn, or change in bowel habits  : NEGATIVE for frequency, dysuria, or hematuria  MUSCULOSKELETAL: NEGATIVE for significant arthralgias or myalgia  NEURO: NEGATIVE for weakness, dizziness or paresthesias  ENDOCRINE: NEGATIVE for temperature intolerance, skin/hair changes  HEME: NEGATIVE for bleeding problems  PSYCHIATRIC: NEGATIVE for changes in mood or affect    Patient Active Problem List    Diagnosis Date Noted     H/O LEEP 04/19/2021     Priority: Medium     approx JAN /2021         Vegetarian 11/24/2020     Priority: Medium     HSIL (high grade squamous intraepithelial lesion) on Pap smear of cervix 11/24/2020     Priority: Medium     6/5/18 NIL pap  11/24/20 HSIL, +HR HPV (not 16/18). Plan: colposcopy due before 2/24/21 12/14/20 Colpo bx LANA I, II, and III. ECC neg. Plan: colposcopy with cotest in 1 year  11/30/21 Reminder  letter  12/30/21 Reminder call -- left message   2/1/22 pre-op appt -- added to visit notes pap due         Anxiety 03/06/2020     Priority: Medium     Moderate major depression, single episode (H) 03/06/2020     Priority: Medium     Von Willebrand disease (H) 03/06/2020     Priority: Medium     Other hyperlipidemia 05/31/2018     Priority: Medium     Irritable bowel syndrome with both constipation and diarrhea 01/05/2017     Priority: Medium     Medication management 01/05/2017     Priority: Medium      Past Medical History:   Diagnosis Date     HSIL (high grade squamous intraepithelial lesion) on Pap smear of cervix 11/24/2020 6/5/18 NIL pap 11/24/20 HSIL, +HR HPV (not 16/18). Plan: colposcopy due before 2/24/21     No past surgical history on file.  Current Outpatient Medications   Medication Sig Dispense Refill     buPROPion (WELLBUTRIN XL) 150 MG 24 hr tablet [BUPROPION (WELLBUTRIN XL) 150 MG 24 HR TABLET] TAKE 1 TABLET(150 MG) BY MOUTH EVERY MORNING 90 tablet 2     CHOLECALCIFEROL, VITAMIN D3, ORAL [CHOLECALCIFEROL, VITAMIN D3, ORAL] Take by mouth daily. Unknown dose       citalopram (CELEXA) 20 MG tablet Take 1 tablet (20 mg) by mouth daily 90 tablet 4     FA/mv,Ca,iron,min/lycopene/lut (MULTIVITAL ORAL) [FA/MV,CA,IRON,MIN/LYCOPENE/LUT (MULTIVITAL ORAL)] Take by mouth.       levonorgestrel-ethinyl estradiol (AVIANE) 0.1-20 MG-MCG tablet Take 1 tablet by mouth daily 84 tablet 4     spironolactone (ALDACTONE) 50 MG tablet Take 50 mg by mouth daily       traZODone (DESYREL) 100 MG tablet Take 2 tablets (200 mg) by mouth At Bedtime 180 tablet 3     traZODone (DESYREL) 100 MG tablet [TRAZODONE (DESYREL) 100 MG TABLET] TAKE 1 TO 1 AND 1/2 TABLETS BY MOUTH AT BEDTIME AS  NEEDED 135 tablet 1       Allergies   Allergen Reactions     Grass Extracts [Gramineae Pollens] Itching     Itchy eyes     Other Environmental Allergy Unknown     Other reaction(s): OTHER (explain in comments), Itchy eyes  Other reaction(s):  "OTHER (explain in comments)  Itchy eyes     Pollen Extracts [Pollen Extract] Itching     Itchy eyes     Penicillins Other (See Comments) and Rash        Social History     Tobacco Use     Smoking status: Never Smoker     Smokeless tobacco: Never Used   Substance Use Topics     Alcohol use: Not on file     No family history on file.  History   Drug Use Not on file         Objective     /70 (BP Location: Right arm, Patient Position: Sitting, Cuff Size: Adult Regular)   Pulse 66   Ht 1.676 m (5' 6\")   Wt 61.7 kg (136 lb)   LMP 01/29/2022 (Exact Date)   SpO2 98%   Breastfeeding No   BMI 21.95 kg/m      Physical Exam    GENERAL APPEARANCE: healthy, alert and no distress     EYES: EOMI, PERRL     HENT: ear canals and TM's normal and nose and mouth without ulcers or lesions     NECK: no adenopathy, no asymmetry, masses, or scars and thyroid normal to palpation     RESP: lungs clear to auscultation - no rales, rhonchi or wheezes     CV: regular rates and rhythm, normal S1 S2, no S3 or S4 and no murmur, click or rub     ABDOMEN:  soft, nontender, no HSM or masses and bowel sounds normal     MS: extremities normal- no gross deformities noted, no evidence of inflammation in joints, FROM in all extremities.     SKIN: no suspicious lesions or rashes     NEURO: Normal strength and tone, sensory exam grossly normal, mentation intact and speech normal     PSYCH: mentation appears normal. and affect normal/bright     LYMPHATICS: No cervical adenopathy    Recent Labs   Lab Test 08/04/21  0859 06/10/21  0000 11/24/20  1016   INR 0.96  --   --    CR  --  1.06*  --    A1C  --   --  5.4        Diagnostics:  No labs were ordered during this visit.   No EKG required, no history of coronary heart disease, significant arrhythmia, peripheral arterial disease or other structural heart disease.    Revised Cardiac Risk Index (RCRI):  The patient has the following serious cardiovascular risks for perioperative complications:   - No " serious cardiac risks = 0 points     RCRI Interpretation: 0 points: Class I (very low risk - 0.4% complication rate)           Signed Electronically by: Wallace Rose CNP  Copy of this evaluation report is provided to requesting physician.

## 2022-02-04 ENCOUNTER — LAB (OUTPATIENT)
Dept: LAB | Facility: CLINIC | Age: 27
End: 2022-02-04
Attending: INTERNAL MEDICINE
Payer: COMMERCIAL

## 2022-02-04 DIAGNOSIS — Z11.59 ENCOUNTER FOR SCREENING FOR OTHER VIRAL DISEASES: ICD-10-CM

## 2022-02-04 PROCEDURE — U0005 INFEC AGEN DETEC AMPLI PROBE: HCPCS

## 2022-02-04 PROCEDURE — U0003 INFECTIOUS AGENT DETECTION BY NUCLEIC ACID (DNA OR RNA); SEVERE ACUTE RESPIRATORY SYNDROME CORONAVIRUS 2 (SARS-COV-2) (CORONAVIRUS DISEASE [COVID-19]), AMPLIFIED PROBE TECHNIQUE, MAKING USE OF HIGH THROUGHPUT TECHNOLOGIES AS DESCRIBED BY CMS-2020-01-R: HCPCS

## 2022-02-04 NOTE — OR NURSING
Writer contacted Luz and let her know that Hope (according to the pre op) has Von Willebrand's. Luz said they were aware of this and she didn't see any note for pre meds prior to OR.

## 2022-02-05 LAB — SARS-COV-2 RNA RESP QL NAA+PROBE: NEGATIVE

## 2022-02-06 ENCOUNTER — HEALTH MAINTENANCE LETTER (OUTPATIENT)
Age: 27
End: 2022-02-06

## 2022-02-08 ENCOUNTER — ANESTHESIA EVENT (OUTPATIENT)
Dept: SURGERY | Facility: CLINIC | Age: 27
End: 2022-02-08
Payer: COMMERCIAL

## 2022-02-08 ENCOUNTER — ANESTHESIA (OUTPATIENT)
Dept: SURGERY | Facility: CLINIC | Age: 27
End: 2022-02-08
Payer: COMMERCIAL

## 2022-02-08 ENCOUNTER — HOSPITAL ENCOUNTER (OUTPATIENT)
Facility: CLINIC | Age: 27
Discharge: HOME | End: 2022-02-08
Attending: INTERNAL MEDICINE | Admitting: INTERNAL MEDICINE
Payer: COMMERCIAL

## 2022-02-08 VITALS
SYSTOLIC BLOOD PRESSURE: 101 MMHG | RESPIRATION RATE: 16 BRPM | OXYGEN SATURATION: 100 % | WEIGHT: 136 LBS | BODY MASS INDEX: 21.86 KG/M2 | HEART RATE: 67 BPM | HEIGHT: 66 IN | DIASTOLIC BLOOD PRESSURE: 58 MMHG | TEMPERATURE: 97.6 F

## 2022-02-08 DIAGNOSIS — K58.2 IRRITABLE BOWEL SYNDROME WITH BOTH CONSTIPATION AND DIARRHEA: Primary | ICD-10-CM

## 2022-02-08 LAB
COLONOSCOPY: NORMAL
UPPER GI ENDOSCOPY: NORMAL

## 2022-02-08 PROCEDURE — 272N000001 HC OR GENERAL SUPPLY STERILE: Performed by: INTERNAL MEDICINE

## 2022-02-08 PROCEDURE — 258N000003 HC RX IP 258 OP 636: Performed by: ANESTHESIOLOGY

## 2022-02-08 PROCEDURE — 370N000017 HC ANESTHESIA TECHNICAL FEE, PER MIN: Performed by: INTERNAL MEDICINE

## 2022-02-08 PROCEDURE — 999N000141 HC STATISTIC PRE-PROCEDURE NURSING ASSESSMENT: Performed by: INTERNAL MEDICINE

## 2022-02-08 PROCEDURE — 250N000009 HC RX 250: Performed by: NURSE ANESTHETIST, CERTIFIED REGISTERED

## 2022-02-08 PROCEDURE — 710N000012 HC RECOVERY PHASE 2, PER MINUTE: Performed by: INTERNAL MEDICINE

## 2022-02-08 PROCEDURE — 88305 TISSUE EXAM BY PATHOLOGIST: CPT | Mod: TC | Performed by: INTERNAL MEDICINE

## 2022-02-08 PROCEDURE — 250N000011 HC RX IP 250 OP 636: Performed by: NURSE ANESTHETIST, CERTIFIED REGISTERED

## 2022-02-08 PROCEDURE — 360N000075 HC SURGERY LEVEL 2, PER MIN: Performed by: INTERNAL MEDICINE

## 2022-02-08 RX ORDER — LIDOCAINE HYDROCHLORIDE 10 MG/ML
INJECTION, SOLUTION INFILTRATION; PERINEURAL PRN
Status: DISCONTINUED | OUTPATIENT
Start: 2022-02-08 | End: 2022-02-08

## 2022-02-08 RX ORDER — PROCHLORPERAZINE MALEATE 10 MG
10 TABLET ORAL EVERY 6 HOURS PRN
Status: CANCELLED | OUTPATIENT
Start: 2022-02-08

## 2022-02-08 RX ORDER — ONDANSETRON 4 MG/1
4 TABLET, ORALLY DISINTEGRATING ORAL EVERY 30 MIN PRN
Status: DISCONTINUED | OUTPATIENT
Start: 2022-02-08 | End: 2022-02-08 | Stop reason: HOSPADM

## 2022-02-08 RX ORDER — OXYCODONE HYDROCHLORIDE 5 MG/1
5 TABLET ORAL EVERY 4 HOURS PRN
Status: DISCONTINUED | OUTPATIENT
Start: 2022-02-08 | End: 2022-02-08 | Stop reason: HOSPADM

## 2022-02-08 RX ORDER — SODIUM CHLORIDE, SODIUM LACTATE, POTASSIUM CHLORIDE, CALCIUM CHLORIDE 600; 310; 30; 20 MG/100ML; MG/100ML; MG/100ML; MG/100ML
INJECTION, SOLUTION INTRAVENOUS CONTINUOUS
Status: DISCONTINUED | OUTPATIENT
Start: 2022-02-08 | End: 2022-02-08 | Stop reason: HOSPADM

## 2022-02-08 RX ORDER — FENTANYL CITRATE 50 UG/ML
25 INJECTION, SOLUTION INTRAMUSCULAR; INTRAVENOUS EVERY 5 MIN PRN
Status: CANCELLED | OUTPATIENT
Start: 2022-02-08

## 2022-02-08 RX ORDER — NALOXONE HYDROCHLORIDE 0.4 MG/ML
0.4 INJECTION, SOLUTION INTRAMUSCULAR; INTRAVENOUS; SUBCUTANEOUS
Status: CANCELLED | OUTPATIENT
Start: 2022-02-08

## 2022-02-08 RX ORDER — LIDOCAINE 40 MG/G
CREAM TOPICAL
Status: DISCONTINUED | OUTPATIENT
Start: 2022-02-08 | End: 2022-02-08 | Stop reason: HOSPADM

## 2022-02-08 RX ORDER — NALOXONE HYDROCHLORIDE 0.4 MG/ML
0.2 INJECTION, SOLUTION INTRAMUSCULAR; INTRAVENOUS; SUBCUTANEOUS
Status: CANCELLED | OUTPATIENT
Start: 2022-02-08

## 2022-02-08 RX ORDER — PROPOFOL 10 MG/ML
INJECTION, EMULSION INTRAVENOUS CONTINUOUS PRN
Status: DISCONTINUED | OUTPATIENT
Start: 2022-02-08 | End: 2022-02-08

## 2022-02-08 RX ORDER — HYDROMORPHONE HCL IN WATER/PF 6 MG/30 ML
0.2 PATIENT CONTROLLED ANALGESIA SYRINGE INTRAVENOUS EVERY 5 MIN PRN
Status: CANCELLED | OUTPATIENT
Start: 2022-02-08

## 2022-02-08 RX ORDER — MEPERIDINE HYDROCHLORIDE 25 MG/ML
12.5 INJECTION INTRAMUSCULAR; INTRAVENOUS; SUBCUTANEOUS
Status: DISCONTINUED | OUTPATIENT
Start: 2022-02-08 | End: 2022-02-08 | Stop reason: HOSPADM

## 2022-02-08 RX ORDER — ONDANSETRON 2 MG/ML
4 INJECTION INTRAMUSCULAR; INTRAVENOUS EVERY 6 HOURS PRN
Status: CANCELLED | OUTPATIENT
Start: 2022-02-08

## 2022-02-08 RX ORDER — ONDANSETRON 2 MG/ML
4 INJECTION INTRAMUSCULAR; INTRAVENOUS EVERY 30 MIN PRN
Status: DISCONTINUED | OUTPATIENT
Start: 2022-02-08 | End: 2022-02-08 | Stop reason: HOSPADM

## 2022-02-08 RX ORDER — FLUMAZENIL 0.1 MG/ML
0.2 INJECTION, SOLUTION INTRAVENOUS
Status: CANCELLED | OUTPATIENT
Start: 2022-02-08 | End: 2022-02-08

## 2022-02-08 RX ORDER — ONDANSETRON 4 MG/1
4 TABLET, ORALLY DISINTEGRATING ORAL EVERY 6 HOURS PRN
Status: CANCELLED | OUTPATIENT
Start: 2022-02-08

## 2022-02-08 RX ORDER — PROPOFOL 10 MG/ML
INJECTION, EMULSION INTRAVENOUS PRN
Status: DISCONTINUED | OUTPATIENT
Start: 2022-02-08 | End: 2022-02-08

## 2022-02-08 RX ORDER — ONDANSETRON 2 MG/ML
4 INJECTION INTRAMUSCULAR; INTRAVENOUS
Status: DISCONTINUED | OUTPATIENT
Start: 2022-02-08 | End: 2022-02-08 | Stop reason: HOSPADM

## 2022-02-08 RX ORDER — FENTANYL CITRATE 50 UG/ML
25 INJECTION, SOLUTION INTRAMUSCULAR; INTRAVENOUS
Status: CANCELLED | OUTPATIENT
Start: 2022-02-08

## 2022-02-08 RX ORDER — ONDANSETRON 2 MG/ML
INJECTION INTRAMUSCULAR; INTRAVENOUS PRN
Status: DISCONTINUED | OUTPATIENT
Start: 2022-02-08 | End: 2022-02-08

## 2022-02-08 RX ADMIN — LIDOCAINE HYDROCHLORIDE 50 MG: 10 INJECTION, SOLUTION INFILTRATION; PERINEURAL at 09:02

## 2022-02-08 RX ADMIN — ONDANSETRON 4 MG: 2 INJECTION INTRAMUSCULAR; INTRAVENOUS at 09:17

## 2022-02-08 RX ADMIN — PROPOFOL 50 MG: 10 INJECTION, EMULSION INTRAVENOUS at 09:02

## 2022-02-08 RX ADMIN — SODIUM CHLORIDE, POTASSIUM CHLORIDE, SODIUM LACTATE AND CALCIUM CHLORIDE: 600; 310; 30; 20 INJECTION, SOLUTION INTRAVENOUS at 08:27

## 2022-02-08 RX ADMIN — MIDAZOLAM 2 MG: 1 INJECTION INTRAMUSCULAR; INTRAVENOUS at 09:06

## 2022-02-08 RX ADMIN — PROPOFOL 200 MCG/KG/MIN: 10 INJECTION, EMULSION INTRAVENOUS at 09:02

## 2022-02-08 ASSESSMENT — MIFFLIN-ST. JEOR: SCORE: 1378.64

## 2022-02-08 NOTE — ANESTHESIA PREPROCEDURE EVALUATION
Anesthesia Pre-Procedure Evaluation    Patient: Keely Cox   MRN: [id number] : 1995        Preoperative Diagnosis: Abdominal pain [R10.9]  Nausea & vomiting [R11.2]  Small intestinal bacterial overgrowth [K63.89]    Procedure : Procedure(s):  ESOPHAGOGASTRODUODENOSCOPY WITH  COLONOSCOPY          Past Medical History:   Diagnosis Date     HSIL (high grade squamous intraepithelial lesion) on Pap smear of cervix 2020 NIL pap 20 HSIL, +HR HPV (not 16/18). Plan: colposcopy due before 21      Past Surgical History:   Procedure Laterality Date     HERNIA REPAIR        Allergies   Allergen Reactions     Grass Extracts [Gramineae Pollens] Itching     Itchy eyes     Other Environmental Allergy Unknown     Other reaction(s): OTHER (explain in comments), Itchy eyes  Other reaction(s): OTHER (explain in comments)  Itchy eyes     Pollen Extracts [Pollen Extract] Itching     Itchy eyes     Penicillins Other (See Comments) and Rash      Social History     Tobacco Use     Smoking status: Never Smoker     Smokeless tobacco: Never Used   Substance Use Topics     Alcohol use: Yes     Comment: Casually on Weekends      Wt Readings from Last 1 Encounters:   22 61.7 kg (136 lb)        Anesthesia Evaluation   Pt has had prior anesthetic.     No history of anesthetic complications       ROS/MED HX  ENT/Pulmonary:  - neg pulmonary ROS     Neurologic:  - neg neurologic ROS     Cardiovascular:  - neg cardiovascular ROS     METS/Exercise Tolerance:     Hematologic: Comments: Von WIllenbrand disease      Musculoskeletal:  - neg musculoskeletal ROS     GI/Hepatic:  - neg GI/hepatic ROS     Renal/Genitourinary:  - neg Renal ROS     Endo:  - neg endo ROS     Psychiatric/Substance Use:  - neg psychiatric ROS     Infectious Disease:  - neg infectious disease ROS     Malignancy:  - neg malignancy ROS     Other:  - neg other ROS          Physical Exam    Airway        Mallampati: II   TM distance: > 3 FB    Neck ROM: full     Respiratory Devices and Support         Dental  no notable dental history         Cardiovascular   cardiovascular exam normal          Pulmonary   pulmonary exam normal                OUTSIDE LABS:  CBC:   Lab Results   Component Value Date    WBC 6.5 02/01/2022    HGB 13.3 02/01/2022    HCT 40.7 02/01/2022     02/01/2022     BMP:   Lab Results   Component Value Date    CR 1.06 (A) 06/10/2021     COAGS:   Lab Results   Component Value Date    INR 0.91 02/01/2022     POC: No results found for: BGM, HCG, HCGS  HEPATIC:   Lab Results   Component Value Date    ALT 16 06/10/2021    AST 15 06/10/2021     OTHER:   Lab Results   Component Value Date    A1C 5.4 11/24/2020    TSH 2.88 11/24/2020       Anesthesia Plan    ASA Status:  2      Anesthesia Type: MAC.              Consents    Anesthesia Plan(s) and associated risks, benefits, and realistic alternatives discussed. Questions answered and patient/representative(s) expressed understanding.     - Discussed: Risks, Benefits and Alternatives for BOTH SEDATION and the PROCEDURE were discussed     - Discussed with:  Patient         Postoperative Care       PONV prophylaxis: Ondansetron (or other 5HT-3)     Comments:                Garfield Petty MD

## 2022-02-08 NOTE — ANESTHESIA POSTPROCEDURE EVALUATION
Patient: Keely Cox    Procedure: Procedure(s):  ESOPHAGOGASTRODUODENOSCOPY WITH biopsies  COLONOSCOPY with biopsies       Diagnosis:Abdominal pain [R10.9]  Nausea & vomiting [R11.2]  Small intestinal bacterial overgrowth [K63.89]  Diagnosis Additional Information: No value filed.    Anesthesia Type:  MAC    Note:     Postop Pain Control: Uneventful            Sign Out: Well controlled pain   PONV: No   Neuro/Psych: Uneventful            Sign Out: Acceptable/Baseline neuro status   Airway/Respiratory: Uneventful            Sign Out: Acceptable/Baseline resp. status   CV/Hemodynamics: Uneventful            Sign Out: Acceptable CV status; No obvious hypovolemia; No obvious fluid overload   Other NRE: NONE   DID A NON-ROUTINE EVENT OCCUR? No           Last vitals:  Vitals Value Taken Time   /58 02/08/22 1010   Temp 36.4  C (97.6  F) 02/08/22 0931   Pulse 87 02/08/22 1013   Resp 16 02/08/22 0950   SpO2 97 % 02/08/22 1013   Vitals shown include unvalidated device data.    Electronically Signed By: Garfield Petty MD  February 8, 2022  10:38 AM

## 2022-02-08 NOTE — H&P
GASTROENTEROLOGY PRE-PROCEDURAL HISTORY AND PHYSICAL     INDICATION FOR PROCEDURE   Diarrhea, abd pain     HISTORY    Reviewed and no change.     PHYSICAL EXAMINATION     Vitals Last menstrual period 01/29/2022, not currently breastfeeding.          Physical Exam   General: awake, alert, oriented times three    Cardiovascular: RRR, no edema    Airway: Normal    Chest: lungs are clear to auscultation bilaterally    Abdomen: soft, non-tender        PREVIOUS REACTION TO SEDATION/ANESTHESIA    None     SEDATION PLAN BASED ON ASSESSMENT   Per Anesthesia or Moderate sedation     ASA CLASSIFICATION   ASA Classification: 2     MALLAMPATI SCORE     Class II      IMPRESSION   Patient deemed adequate candidate for anesthesia.     PLANNED PROCEDURE   egd/colonoscopy     Matt Josue M.D.  [facility]  I appreciate the opportunity to participate in the care of this patient.   Please feel free to call me with any questions or concerns.

## 2022-02-08 NOTE — ANESTHESIA CARE TRANSFER NOTE
Patient: Keely Cox    Procedure: Procedure(s):  ESOPHAGOGASTRODUODENOSCOPY WITH biopsies  COLONOSCOPY with biopsies       Diagnosis: Abdominal pain [R10.9]  Nausea & vomiting [R11.2]  Small intestinal bacterial overgrowth [K63.89]  Diagnosis Additional Information: No value filed.    Anesthesia Type:   MAC     Note:    Oropharynx: oropharynx clear of all foreign objects  Level of Consciousness: awake  Oxygen Supplementation: room air    Independent Airway: airway patency satisfactory and stable  Dentition: dentition unchanged  Vital Signs Stable: post-procedure vital signs reviewed and stable  Report to RN Given: handoff report given  Patient transferred to: Phase II    Handoff Report: Identifed the Patient, Identified the Reponsible Provider, Reviewed the pertinent medical history, Discussed the surgical course, Reviewed Intra-OP anesthesia mangement and issues during anesthesia, Set expectations for post-procedure period and Allowed opportunity for questions and acknowledgement of understanding      Vitals:  Vitals Value Taken Time   BP 97/56 02/08/22 0931   Temp 36.4  C (97.6  F) 02/08/22 0931   Pulse 79 02/08/22 0931   Resp 16 02/08/22 0931   SpO2 99 % 02/08/22 0931       Electronically Signed By: CALDERON George CRNA  February 8, 2022  9:35 AM

## 2022-02-10 LAB
PATH REPORT.COMMENTS IMP SPEC: NORMAL
PATH REPORT.FINAL DX SPEC: NORMAL
PATH REPORT.GROSS SPEC: NORMAL
PATH REPORT.MICROSCOPIC SPEC OTHER STN: NORMAL
PATH REPORT.RELEVANT HX SPEC: NORMAL
PHOTO IMAGE: NORMAL

## 2022-02-10 PROCEDURE — 88342 IMHCHEM/IMCYTCHM 1ST ANTB: CPT | Mod: 26 | Performed by: PATHOLOGY

## 2022-02-10 PROCEDURE — 88305 TISSUE EXAM BY PATHOLOGIST: CPT | Mod: 26 | Performed by: PATHOLOGY

## 2022-05-18 DIAGNOSIS — F41.1 GAD (GENERALIZED ANXIETY DISORDER): ICD-10-CM

## 2022-05-19 RX ORDER — BUPROPION HYDROCHLORIDE 150 MG/1
TABLET ORAL
Qty: 90 TABLET | Refills: 2 | Status: SHIPPED | OUTPATIENT
Start: 2022-05-19

## 2022-05-19 NOTE — TELEPHONE ENCOUNTER
"Last Written Prescription Date:  8/24/21  Last Fill Quantity: 90,  # refills: 2   Last office visit provider:  2/1/22     Requested Prescriptions   Pending Prescriptions Disp Refills     buPROPion (WELLBUTRIN XL) 150 MG 24 hr tablet [Pharmacy Med Name: buPROPion HCl ER (XL) 150 MG Oral Tablet Extended Release 24 Hour] 90 tablet 3     Sig: TAKE 1 TABLET BY MOUTH IN  THE MORNING       SSRIs Protocol Passed - 5/18/2022  9:10 PM        Passed - Recent (12 mo) or future (30 days) visit within the authorizing provider's specialty     Patient has had an office visit with the authorizing provider or a provider within the authorizing providers department within the previous 12 mos or has a future within next 30 days. See \"Patient Info\" tab in inbasket, or \"Choose Columns\" in Meds & Orders section of the refill encounter.              Passed - Medication is Bupropion     If the medication is Bupropion (Wellbutrin), and the patient is taking for smoking cessation; OK to refill.          Passed - Medication is active on med list        Passed - Patient is age 18 or older        Passed - No active pregnancy on record        Passed - No positive pregnancy test in last 12 months             Alberta Tatum, RN 05/19/22 6:40 PM  "

## 2022-08-08 DIAGNOSIS — Z30.09 GENERAL COUNSELING FOR PRESCRIPTION OF ORAL CONTRACEPTIVES: ICD-10-CM

## 2022-08-09 RX ORDER — LEVONORGESTREL AND ETHINYL ESTRADIOL 0.1-0.02MG
KIT ORAL
Qty: 84 TABLET | Refills: 3 | OUTPATIENT
Start: 2022-08-09

## 2022-08-30 DIAGNOSIS — F32.1 MODERATE MAJOR DEPRESSION, SINGLE EPISODE (H): ICD-10-CM

## 2022-08-31 RX ORDER — CITALOPRAM HYDROBROMIDE 20 MG/1
20 TABLET ORAL DAILY
Qty: 90 TABLET | Refills: 0 | Status: SHIPPED | OUTPATIENT
Start: 2022-08-31 | End: 2022-11-25

## 2022-08-31 NOTE — TELEPHONE ENCOUNTER
"Routing refill request to provider for review/approval because:  PHQ 9 score - not on file/out of date.  Patient needs to be seen because it has been more than 6 months since last office visit.    Last Written Prescription Date:  8/24/21  Last Fill Quantity: 90,  # refills: 4   Last office visit provider:  2/1/22     Requested Prescriptions   Pending Prescriptions Disp Refills     citalopram (CELEXA) 20 MG tablet [Pharmacy Med Name: Citalopram Hydrobromide 20 MG Oral Tablet] 90 tablet 3     Sig: TAKE 1 TABLET BY MOUTH  DAILY       SSRIs Protocol Failed - 8/31/2022  9:39 AM        Failed - PHQ-9 score less than 5 in past 6 months     Please review last PHQ-9 score.           Failed - Recent (6 mo) or future (30 days) visit within the authorizing provider's specialty     Patient had office visit in the last 6 months or has a visit in the next 30 days with authorizing provider or within the authorizing provider's specialty.  See \"Patient Info\" tab in inbasket, or \"Choose Columns\" in Meds & Orders section of the refill encounter.            Passed - Medication is active on med list        Passed - Patient is age 18 or older        Passed - No active pregnancy on record        Passed - No positive pregnancy test in last 12 months             Pierce Lake RN 08/31/22 9:39 AM  "

## 2022-09-23 DIAGNOSIS — Z30.09 GENERAL COUNSELING FOR PRESCRIPTION OF ORAL CONTRACEPTIVES: ICD-10-CM

## 2022-09-25 RX ORDER — LEVONORGESTREL AND ETHINYL ESTRADIOL 0.1-0.02MG
KIT ORAL
Qty: 84 TABLET | Refills: 3 | Status: SHIPPED | OUTPATIENT
Start: 2022-09-25

## 2022-09-25 NOTE — TELEPHONE ENCOUNTER
"Due to be seen    Last Written Prescription Date:  8/29/21  Last Fill Quantity: 84,  # refills: 4   Last office visit provider:  7/20/21     Requested Prescriptions   Pending Prescriptions Disp Refills     AVIANE 0.1-20 MG-MCG tablet [Pharmacy Med Name: AVIANE TAB 0.1MG/0.02MG] 84 tablet 3     Sig: TAKE 1 TABLET BY MOUTH  DAILY       Contraceptives Protocol Passed - 9/23/2022 10:06 PM        Passed - Patient is not a current smoker if age is 35 or older        Passed - Recent (12 mo) or future (30 days) visit within the authorizing provider's specialty     Patient has had an office visit with the authorizing provider or a provider within the authorizing providers department within the previous 12 mos or has a future within next 30 days. See \"Patient Info\" tab in inbasket, or \"Choose Columns\" in Meds & Orders section of the refill encounter.              Passed - Medication is active on med list        Passed - No active pregnancy on record        Passed - No positive pregnancy test in past 12 months             Alberta Tatum RN 09/24/22 8:12 PM  "

## 2022-09-29 DIAGNOSIS — F51.05 INSOMNIA SECONDARY TO ANXIETY: ICD-10-CM

## 2022-09-29 DIAGNOSIS — F41.9 INSOMNIA SECONDARY TO ANXIETY: ICD-10-CM

## 2022-09-30 NOTE — TELEPHONE ENCOUNTER
"Routing refill request to provider for review/approval because:  Drug interaction warning    Last Written Prescription Date:  10/9/2021  Last Fill Quantity: 180,  # refills: 3   Last office visit provider:  2/1/2022     Requested Prescriptions   Pending Prescriptions Disp Refills     traZODone (DESYREL) 100 MG tablet [Pharmacy Med Name: traZODone HCl 100 MG Oral Tablet] 180 tablet 1     Sig: TAKE 2 TABLETS BY MOUTH AT  BEDTIME       Serotonin Modulators Passed - 9/29/2022  9:29 PM        Passed - Recent (12 mo) or future (30 days) visit within the authorizing provider's specialty     Patient has had an office visit with the authorizing provider or a provider within the authorizing providers department within the previous 12 mos or has a future within next 30 days. See \"Patient Info\" tab in inbasket, or \"Choose Columns\" in Meds & Orders section of the refill encounter.              Passed - Medication is active on med list        Passed - Patient is age 18 or older        Passed - No active pregnancy on record        Passed - No positive pregnancy test in past 12 months             Yoanna Marshall RN 09/30/22 2:45 PM    Requested Prescriptions   Pending Prescriptions Disp Refills     traZODone (DESYREL) 100 MG tablet [Pharmacy Med Name: traZODone HCl 100 MG Oral Tablet] 180 tablet 3     Sig: TAKE 2 TABLETS BY MOUTH AT  BEDTIME       Serotonin Modulators Passed - 9/29/2022  9:29 PM        Passed - Recent (12 mo) or future (30 days) visit within the authorizing provider's specialty     Patient has had an office visit with the authorizing provider or a provider within the authorizing providers department within the previous 12 mos or has a future within next 30 days. See \"Patient Info\" tab in inbasket, or \"Choose Columns\" in Meds & Orders section of the refill encounter.              Passed - Medication is active on med list        Passed - Patient is age 18 or older        Passed - No active pregnancy on record        " Passed - No positive pregnancy test in past 12 months             Yoanna Marshall RN 09/30/22 2:43 PM

## 2022-10-01 ENCOUNTER — HEALTH MAINTENANCE LETTER (OUTPATIENT)
Age: 27
End: 2022-10-01

## 2022-10-03 RX ORDER — TRAZODONE HYDROCHLORIDE 100 MG/1
TABLET ORAL
Qty: 180 TABLET | Refills: 1 | Status: SHIPPED | OUTPATIENT
Start: 2022-10-03 | End: 2023-01-20

## 2022-11-23 DIAGNOSIS — F32.1 MODERATE MAJOR DEPRESSION, SINGLE EPISODE (H): ICD-10-CM

## 2022-11-24 NOTE — TELEPHONE ENCOUNTER
"Routing refill request to provider for review/approval because:  phq 9    Last Written Prescription Date:  8/31/22  Last Fill Quantity: 90,  # refills: 0   Last office visit provider:  2/1/22     Requested Prescriptions   Pending Prescriptions Disp Refills     citalopram (CELEXA) 20 MG tablet [Pharmacy Med Name: Citalopram Hydrobromide 20 MG Oral Tablet] 90 tablet 3     Sig: TAKE 1 TABLET BY MOUTH  DAILY       SSRIs Protocol Failed - 11/23/2022  9:12 PM        Failed - PHQ-9 score less than 5 in past 6 months     Please review last PHQ-9 score.           Failed - Recent (6 mo) or future (30 days) visit within the authorizing provider's specialty     Patient had office visit in the last 6 months or has a visit in the next 30 days with authorizing provider or within the authorizing provider's specialty.  See \"Patient Info\" tab in inbasket, or \"Choose Columns\" in Meds & Orders section of the refill encounter.            Passed - Medication is active on med list        Passed - Patient is age 18 or older        Passed - No active pregnancy on record        Passed - No positive pregnancy test in last 12 months             Alberta Tatum, RN 11/24/22 5:56 PM  "

## 2022-11-25 RX ORDER — CITALOPRAM HYDROBROMIDE 20 MG/1
TABLET ORAL
Qty: 90 TABLET | Refills: 3 | Status: SHIPPED | OUTPATIENT
Start: 2022-11-25

## 2022-11-30 ENCOUNTER — TRANSFERRED RECORDS (OUTPATIENT)
Dept: HEALTH INFORMATION MANAGEMENT | Facility: CLINIC | Age: 27
End: 2022-11-30

## 2023-01-19 DIAGNOSIS — F41.9 INSOMNIA SECONDARY TO ANXIETY: ICD-10-CM

## 2023-01-19 DIAGNOSIS — F51.05 INSOMNIA SECONDARY TO ANXIETY: ICD-10-CM

## 2023-01-20 NOTE — TELEPHONE ENCOUNTER
Routing refill request to provider for review/approval because:  Drug interaction warning: High, citalopram and trazodone    Writer sent MyChart message to pt to schedule annual physical.     Emma Patino RN

## 2023-01-23 RX ORDER — TRAZODONE HYDROCHLORIDE 100 MG/1
TABLET ORAL
Qty: 60 TABLET | Refills: 0 | Status: SHIPPED | OUTPATIENT
Start: 2023-01-23

## 2023-02-11 ENCOUNTER — HEALTH MAINTENANCE LETTER (OUTPATIENT)
Age: 28
End: 2023-02-11

## 2023-06-27 ENCOUNTER — TRANSFERRED RECORDS (OUTPATIENT)
Dept: HEALTH INFORMATION MANAGEMENT | Facility: CLINIC | Age: 28
End: 2023-06-27
Payer: COMMERCIAL

## 2024-05-18 ENCOUNTER — HEALTH MAINTENANCE LETTER (OUTPATIENT)
Age: 29
End: 2024-05-18

## 2025-04-06 ENCOUNTER — HOSPITAL ENCOUNTER (EMERGENCY)
Facility: HOSPITAL | Age: 30
Discharge: HOME | End: 2025-04-07
Admitting: STUDENT IN AN ORGANIZED HEALTH CARE EDUCATION/TRAINING PROGRAM
Payer: COMMERCIAL

## 2025-04-06 DIAGNOSIS — O21.9 NAUSEA AND VOMITING DURING PREGNANCY: ICD-10-CM

## 2025-04-06 LAB
ALBUMIN SERPL BCG-MCNC: 4.5 G/DL (ref 3.5–5.2)
ALBUMIN SERPL BCG-MCNC: 4.5 G/DL (ref 3.5–5.2)
ALBUMIN UR-MCNC: NEGATIVE MG/DL
ALP SERPL-CCNC: 68 U/L (ref 40–150)
ALP SERPL-CCNC: 68 U/L (ref 40–150)
ALT SERPL W P-5'-P-CCNC: 28 U/L (ref 0–50)
ALT SERPL W P-5'-P-CCNC: 28 U/L (ref 0–50)
ANION GAP SERPL CALCULATED.3IONS-SCNC: 8 MMOL/L (ref 7–15)
APPEARANCE UR: CLEAR
AST SERPL W P-5'-P-CCNC: 18 U/L (ref 0–45)
AST SERPL W P-5'-P-CCNC: 18 U/L (ref 0–45)
BACTERIA #/AREA URNS HPF: ABNORMAL /HPF
BASOPHILS # BLD AUTO: 0.1 10E3/UL (ref 0–0.2)
BASOPHILS NFR BLD AUTO: 0 %
BILIRUB DIRECT SERPL-MCNC: 0.34 MG/DL (ref 0–0.3)
BILIRUB SERPL-MCNC: 1.3 MG/DL
BILIRUB SERPL-MCNC: 1.3 MG/DL
BILIRUB UR QL STRIP: NEGATIVE
BUN SERPL-MCNC: 6.6 MG/DL (ref 6–20)
CALCIUM SERPL-MCNC: 9.2 MG/DL (ref 8.8–10.4)
CHLORIDE SERPL-SCNC: 101 MMOL/L (ref 98–107)
COLOR UR AUTO: ABNORMAL
CREAT SERPL-MCNC: 0.56 MG/DL (ref 0.51–0.95)
EGFRCR SERPLBLD CKD-EPI 2021: >90 ML/MIN/1.73M2
EOSINOPHIL # BLD AUTO: 0 10E3/UL (ref 0–0.7)
EOSINOPHIL NFR BLD AUTO: 0 %
ERYTHROCYTE [DISTWIDTH] IN BLOOD BY AUTOMATED COUNT: 12.5 % (ref 10–15)
FLUAV RNA SPEC QL NAA+PROBE: NEGATIVE
FLUBV RNA RESP QL NAA+PROBE: NEGATIVE
GLUCOSE BLDC GLUCOMTR-MCNC: 112 MG/DL (ref 70–99)
GLUCOSE SERPL-MCNC: 105 MG/DL (ref 70–99)
GLUCOSE UR STRIP-MCNC: NEGATIVE MG/DL
HCO3 SERPL-SCNC: 27 MMOL/L (ref 22–29)
HCT VFR BLD AUTO: 42.9 % (ref 35–47)
HGB BLD-MCNC: 14.9 G/DL (ref 11.7–15.7)
HGB UR QL STRIP: NEGATIVE
HOLD SPECIMEN: NORMAL
IMM GRANULOCYTES # BLD: 0.3 10E3/UL
IMM GRANULOCYTES NFR BLD: 2 %
KETONES UR STRIP-MCNC: 40 MG/DL
LEUKOCYTE ESTERASE UR QL STRIP: NEGATIVE
LIPASE SERPL-CCNC: 19 U/L (ref 13–60)
LYMPHOCYTES # BLD AUTO: 1.2 10E3/UL (ref 0.8–5.3)
LYMPHOCYTES NFR BLD AUTO: 10 %
MAGNESIUM SERPL-MCNC: 2 MG/DL (ref 1.7–2.3)
MCH RBC QN AUTO: 29.2 PG (ref 26.5–33)
MCHC RBC AUTO-ENTMCNC: 34.7 G/DL (ref 31.5–36.5)
MCV RBC AUTO: 84 FL (ref 78–100)
MONOCYTES # BLD AUTO: 0.5 10E3/UL (ref 0–1.3)
MONOCYTES NFR BLD AUTO: 4 %
MUCOUS THREADS #/AREA URNS LPF: PRESENT /LPF
NEUTROPHILS # BLD AUTO: 10.1 10E3/UL (ref 1.6–8.3)
NEUTROPHILS NFR BLD AUTO: 83 %
NITRATE UR QL: NEGATIVE
NRBC # BLD AUTO: 0 10E3/UL
NRBC BLD AUTO-RTO: 0 /100
PH UR STRIP: 6 [PH] (ref 5–7)
PLATELET # BLD AUTO: 311 10E3/UL (ref 150–450)
POTASSIUM SERPL-SCNC: 3.4 MMOL/L (ref 3.4–5.3)
PROT SERPL-MCNC: 7.4 G/DL (ref 6.4–8.3)
PROT SERPL-MCNC: 7.4 G/DL (ref 6.4–8.3)
RBC # BLD AUTO: 5.1 10E6/UL (ref 3.8–5.2)
RBC URINE: 1 /HPF
RSV RNA SPEC NAA+PROBE: NEGATIVE
SARS-COV-2 RNA RESP QL NAA+PROBE: NEGATIVE
SODIUM SERPL-SCNC: 136 MMOL/L (ref 135–145)
SP GR UR STRIP: 1.01 (ref 1–1.03)
SQUAMOUS EPITHELIAL: <1 /HPF
UROBILINOGEN UR STRIP-MCNC: NORMAL MG/DL
WBC # BLD AUTO: 12.1 10E3/UL (ref 4–11)
WBC URINE: 2 /HPF

## 2025-04-06 PROCEDURE — 96361 HYDRATE IV INFUSION ADD-ON: CPT

## 2025-04-06 PROCEDURE — 36415 COLL VENOUS BLD VENIPUNCTURE: CPT | Performed by: STUDENT IN AN ORGANIZED HEALTH CARE EDUCATION/TRAINING PROGRAM

## 2025-04-06 PROCEDURE — 83690 ASSAY OF LIPASE: CPT | Performed by: STUDENT IN AN ORGANIZED HEALTH CARE EDUCATION/TRAINING PROGRAM

## 2025-04-06 PROCEDURE — 83735 ASSAY OF MAGNESIUM: CPT | Performed by: STUDENT IN AN ORGANIZED HEALTH CARE EDUCATION/TRAINING PROGRAM

## 2025-04-06 PROCEDURE — 85025 COMPLETE CBC W/AUTO DIFF WBC: CPT | Performed by: STUDENT IN AN ORGANIZED HEALTH CARE EDUCATION/TRAINING PROGRAM

## 2025-04-06 PROCEDURE — 80053 COMPREHEN METABOLIC PANEL: CPT | Performed by: STUDENT IN AN ORGANIZED HEALTH CARE EDUCATION/TRAINING PROGRAM

## 2025-04-06 PROCEDURE — 82248 BILIRUBIN DIRECT: CPT | Performed by: STUDENT IN AN ORGANIZED HEALTH CARE EDUCATION/TRAINING PROGRAM

## 2025-04-06 PROCEDURE — 99284 EMERGENCY DEPT VISIT MOD MDM: CPT | Mod: 25

## 2025-04-06 PROCEDURE — 87637 SARSCOV2&INF A&B&RSV AMP PRB: CPT | Performed by: STUDENT IN AN ORGANIZED HEALTH CARE EDUCATION/TRAINING PROGRAM

## 2025-04-06 PROCEDURE — 96374 THER/PROPH/DIAG INJ IV PUSH: CPT

## 2025-04-06 PROCEDURE — 82962 GLUCOSE BLOOD TEST: CPT

## 2025-04-06 PROCEDURE — 81001 URINALYSIS AUTO W/SCOPE: CPT | Performed by: STUDENT IN AN ORGANIZED HEALTH CARE EDUCATION/TRAINING PROGRAM

## 2025-04-06 PROCEDURE — 258N000003 HC RX IP 258 OP 636: Performed by: STUDENT IN AN ORGANIZED HEALTH CARE EDUCATION/TRAINING PROGRAM

## 2025-04-06 PROCEDURE — 250N000011 HC RX IP 250 OP 636: Performed by: STUDENT IN AN ORGANIZED HEALTH CARE EDUCATION/TRAINING PROGRAM

## 2025-04-06 RX ORDER — ONDANSETRON 2 MG/ML
4 INJECTION INTRAMUSCULAR; INTRAVENOUS ONCE
Status: COMPLETED | OUTPATIENT
Start: 2025-04-06 | End: 2025-04-06

## 2025-04-06 RX ADMIN — SODIUM CHLORIDE, SODIUM LACTATE, POTASSIUM CHLORIDE, AND CALCIUM CHLORIDE 2000 ML: .6; .31; .03; .02 INJECTION, SOLUTION INTRAVENOUS at 22:28

## 2025-04-06 RX ADMIN — ONDANSETRON 4 MG: 2 INJECTION, SOLUTION INTRAMUSCULAR; INTRAVENOUS at 22:33

## 2025-04-06 ASSESSMENT — COLUMBIA-SUICIDE SEVERITY RATING SCALE - C-SSRS
6. HAVE YOU EVER DONE ANYTHING, STARTED TO DO ANYTHING, OR PREPARED TO DO ANYTHING TO END YOUR LIFE?: NO
1. IN THE PAST MONTH, HAVE YOU WISHED YOU WERE DEAD OR WISHED YOU COULD GO TO SLEEP AND NOT WAKE UP?: NO
2. HAVE YOU ACTUALLY HAD ANY THOUGHTS OF KILLING YOURSELF IN THE PAST MONTH?: NO

## 2025-04-06 ASSESSMENT — ACTIVITIES OF DAILY LIVING (ADL): ADLS_ACUITY_SCORE: 41

## 2025-04-07 VITALS
DIASTOLIC BLOOD PRESSURE: 83 MMHG | OXYGEN SATURATION: 100 % | TEMPERATURE: 98.5 F | RESPIRATION RATE: 16 BRPM | BODY MASS INDEX: 21.58 KG/M2 | SYSTOLIC BLOOD PRESSURE: 118 MMHG | HEART RATE: 76 BPM | WEIGHT: 134.3 LBS | HEIGHT: 66 IN

## 2025-04-07 RX ORDER — ONDANSETRON 4 MG/1
4 TABLET, ORALLY DISINTEGRATING ORAL EVERY 8 HOURS PRN
Qty: 15 TABLET | Refills: 0 | Status: SHIPPED | OUTPATIENT
Start: 2025-04-07

## 2025-04-07 NOTE — ED PROVIDER NOTES
Emergency Department Encounter   NAME: Keely Looney ; AGE: 29 year old female ; YOB: 1995 ; MRN: [id number] ; PCP: Gris Deras   ED PROVIDER: Lisa Canada PA-C    Evaluation Date & Time:   2025 10:13 PM    CHIEF COMPLAINT:  Nausea & Vomiting        Impression and Plan   FINAL IMPRESSION:    ICD-10-CM    1. Nausea and vomiting during pregnancy  O21.9           ED Course and Medical Decision Making  Patient is a 29 year old female with PMH of anxiety and IBS currently 7 weeks pregnant presenting to the ED with nausea and vomiting.  Patient states her nausea and vomiting started today, she has been having difficulty keeping down any food or fluids.  Denies any abdominal pain or flank pain.  No diarrhea.  No known foodborne illness exposures.  No fevers.  No urinary symptoms.  No vaginal bleeding or cramping.    Vitals reviewed and unremarkable, she is afebrile. On exam she is resting comfortably, no acute distress. No active emesis. Differential diagnosis/emergent conditions considered and evaluated for includes but not limited to COVID, influenza, viral gastroenteritis, foodborne illness, UTI, appendicitis, gallbladder pathology, gastritis, miscarriage, septic .  Her abdomen is soft and nondistended.  She has no areas of tenderness, certainly no right upper quadrant or right lower quadrant tenderness.  No CVA tenderness bilaterally.  Lung sounds are present throughout and clear to auscultation.     Her COVID and influenza swabs are negative.  She has a very mild leukocytosis of 12.1 today, she does not have a fever and I have low suspicion for infectious etiology for her nausea and vomiting.  CMP does not find any significant electrolyte abnormalities.  No JOSÉ LUIS.  No evidence of significant dehydration.  LFTs are normal.  Glucose 105. Lipase normal. UA does not show evidence of UTI.     Patient endorses significant improvement after administration of Zofran and a liter of fluids here.   Given her benign abdominal exam, reassuring lab work, and stable vitals we discussed performing abdominal imaging with ultrasound or MRI today versus watchful waiting.  She does not have any abdominal pain or bleeding so I do not think emergent OB US is indicated. Patient is feeling much better and does not feel any abdominal imaging is indicated at this time.  I have given her a prescription for Zofran for home to take as needed for nausea and vomiting.  Recommended she push lots of fluids.  She should call her OB/GYN office to  follow-up later this week if her symptoms are not improving.  She was given strict return precautions to the emergency department and is understanding and agreeable to this plan.      Medical Decision Making  I considered additional work up, including US RUQ, US RLQ, MR abdomen, but deferred due to benign abdominal exam and reassuring lab work.  Discharge. I prescribed additional prescription strength medication(s) as charted. See documentation for any additional details.    MIPS (CTPE, Dental pain, Bui, Sinusitis, Asthma/COPD, Head Trauma): Not Applicable    SEPSIS: None    I considered escalation of care and admitting the patient but ultimately did not given reassuring exam and workup.        ED COURSE:  10:30 PM I met and introduced myself to the patient. I gathered initial history and performed my physical exam. We discussed plan for initial workup.   12:41 AM I rechecked the patient and discussed results, discharge, follow up, and reasons to return to the ED.     At the conclusion of the encounter I discussed the results of all the tests and the disposition. The questions were answered. The patient or family acknowledged understanding and was agreeable with the care plan.        MEDICATIONS GIVEN IN THE EMERGENCY DEPARTMENT:  Medications   lactated ringers BOLUS 2,000 mL (0 mLs Intravenous Stopped 4/7/25 0029)   ondansetron (ZOFRAN) injection 4 mg (4 mg Intravenous $Given 4/6/25  "2233)         NEW PRESCRIPTIONS STARTED AT TODAY'S ED VISIT:  New Prescriptions    ONDANSETRON (ZOFRAN ODT) 4 MG ODT TAB    Take 1 tablet (4 mg) by mouth every 8 hours as needed for nausea or vomiting.         VITA Looney is a 29 year old female with a pertinent history of hyperlipidemia who presents to the ED by private car for evaluation of nausea and vomiting.     Patient has been vomiting since 5 AM this morning. She reports that she has been experiencing abdominal pain since Friday. Episodes are not provoked by eating and are more random. Endorses an increase in passing gas, weakness, diffuse pain in lower back, and slight cramping in groin area. Has normal bowel movements. States that she has not had any prior sickness or nausea and vomiting throughout her pregnancy. Denies diarrhea, hematochezia, abdominal cramping, urinary frequency, malodorous urine, and fevers. Denies any recent travel. No history of appendectomy and cholecystectomy.       Physical Exam     First Vitals:  Patient Vitals for the past 24 hrs:   BP Temp Temp src Pulse Resp SpO2 Height Weight   04/07/25 0014 -- -- -- 76 -- -- -- --   04/06/25 2120 (!) 143/90 98.5  F (36.9  C) Oral 101 16 100 % 1.676 m (5' 6\") 60.9 kg (134 lb 4.8 oz)         PHYSICAL EXAM    General Appearance:  Alert, cooperative, no distress, appears stated age  Respiratory: No distress. Lungs clear to ausculation bilaterally. No wheezes, rhonchi or stridor  Cardiovascular: Regular rate. No peripheral edema  GI: Abdomen soft, nontender, normal bowel sounds  : No CVA tenderness  Musculoskeletal: Moving all extremities. No gross deformities  Integument: Warm, dry, no rashes or lesions  Neurologic: Alert and orientated x3. No focal deficits.  Psych: Normal mood and affect        Results     LAB:  All pertinent labs reviewed and interpreted  Labs Ordered and Resulted from Time of ED Arrival to Time of ED Departure   COMPREHENSIVE METABOLIC PANEL - Abnormal       " Result Value    Sodium 136      Potassium 3.4      Carbon Dioxide (CO2) 27      Anion Gap 8      Urea Nitrogen 6.6      Creatinine 0.56      GFR Estimate >90      Calcium 9.2      Chloride 101      Glucose 105 (*)     Alkaline Phosphatase 68      AST 18      ALT 28      Protein Total 7.4      Albumin 4.5      Bilirubin Total 1.3 (*)    CBC WITH PLATELETS AND DIFFERENTIAL - Abnormal    WBC Count 12.1 (*)     RBC Count 5.10      Hemoglobin 14.9      Hematocrit 42.9      MCV 84      MCH 29.2      MCHC 34.7      RDW 12.5      Platelet Count 311      % Neutrophils 83      % Lymphocytes 10      % Monocytes 4      % Eosinophils 0      % Basophils 0      % Immature Granulocytes 2      NRBCs per 100 WBC 0      Absolute Neutrophils 10.1 (*)     Absolute Lymphocytes 1.2      Absolute Monocytes 0.5      Absolute Eosinophils 0.0      Absolute Basophils 0.1      Absolute Immature Granulocytes 0.3      Absolute NRBCs 0.0     GLUCOSE BY METER - Abnormal    GLUCOSE BY METER POCT 112 (*)    HEPATIC FUNCTION PANEL - Abnormal    Protein Total 7.4      Albumin 4.5      Bilirubin Total 1.3 (*)     Alkaline Phosphatase 68      AST 18      ALT 28      Bilirubin Direct 0.34 (*)    ROUTINE UA WITH MICROSCOPIC REFLEX TO CULTURE - Abnormal    Color Urine Light Yellow      Appearance Urine Clear      Glucose Urine Negative      Bilirubin Urine Negative      Ketones Urine 40 (*)     Specific Gravity Urine 1.008      Blood Urine Negative      pH Urine 6.0      Protein Albumin Urine Negative      Urobilinogen Urine Normal      Nitrite Urine Negative      Leukocyte Esterase Urine Negative      Bacteria Urine Few (*)     Mucus Urine Present (*)     RBC Urine 1      WBC Urine 2      Squamous Epithelials Urine <1     MAGNESIUM - Normal    Magnesium 2.0     INFLUENZA A/B, RSV AND SARS-COV2 PCR - Normal    Influenza A PCR Negative      Influenza B PCR Negative      RSV PCR Negative      SARS CoV2 PCR Negative     LIPASE - Normal    Lipase 19     GLUCOSE  MONITOR NURSING POCT       RADIOLOGY:  No orders to display             I, Flor Bills, am serving as a scribe to document services personally performed by Lisa Canada PA-C, based on my observation and the provider's statements to me. I, Lisa Canada PA-C attest that Flro Bills is acting in a scribe capacity, has observed my performance of the services and has documented them in accordance with my direction.       Lisa Canada PA-C   Emergency Medicine   [facility] EMERGENCY DEPARTMENT       Lisa Canada PA-C  04/07/25 0142

## 2025-04-07 NOTE — ED TRIAGE NOTES
Patient arrives to triage with chief complaint of nausea and vomiting which started today.  Patient is 7 weeks pregnant, is under the care of OB.  Unable to keep anything down.  Alert and oriented x4.

## 2025-04-07 NOTE — DISCHARGE INSTRUCTIONS
Your lab work here today is all reassuring.  Your urine does not show urinary tract infection.  You were given fluids and nausea medication here in the emergency department with good improvement.  Your abdominal exam is benign so we ultimately did not decide to do any ultrasounds or MRIs of your abdomen today.    I have given you a prescription for Zofran for home, you can take this up to 3 times per day for nausea and vomiting.  This medication can cause constipation  Call your OB clinic to schedule follow-up with them for this week.  Return to the emergency department for any severe intractable nausea or vomiting, high fevers, or severe abdominal pain.

## 2025-04-08 ENCOUNTER — PATIENT OUTREACH (OUTPATIENT)
Dept: FAMILY MEDICINE | Facility: CLINIC | Age: 30
End: 2025-04-08
Payer: COMMERCIAL

## 2025-05-08 ENCOUNTER — MEDICAL CORRESPONDENCE (OUTPATIENT)
Dept: HEALTH INFORMATION MANAGEMENT | Facility: CLINIC | Age: 30
End: 2025-05-08
Payer: COMMERCIAL

## 2025-05-08 ENCOUNTER — TRANSCRIBE ORDERS (OUTPATIENT)
Dept: OTHER | Age: 30
End: 2025-05-08

## 2025-05-08 DIAGNOSIS — D68.00 VON WILLEBRAND'S DISEASE (H): Primary | ICD-10-CM

## 2025-06-08 ENCOUNTER — HEALTH MAINTENANCE LETTER (OUTPATIENT)
Age: 30
End: 2025-06-08

## 2025-06-16 NOTE — PROGRESS NOTES
"    [facility]  [address] [place] [id number]  Main: [phone], Fax: [phone]    Patient seen at: [facility] at [address] [place] [address]    Outpatient Visit Note:    Patient: Keely Looney  MRN: [id number]  : 1995  JUDSON: 2025  Location of this writer at the time of this clinic visit was conducted: [facility].  Location of the patient at the time of this clinic visit was conducted: [facility].     Reason for visit:  Von Willebrand Disease. Pregnant at 19 weeks gestation.     HPI:  Keely is a 29 year old  female with a reported history of Von Willebrand Disease, who is referred by Dr. Juarez Harrison of [facility] at [facility], for consultation as she is currently at around 20 weeks pregnant with her first ever pregnancy.    We do not have any records in regard to her diagnosis of Von Willebrand Disease.     In speaking to Keely today, she was told that she has Von Willebrand Disease many years ago prior to her ghulam year in high school. At the time, she was residing in [facility]. Her mother sent her a picture via her mobile phone of a pocket size card that says \"[facility]\" on the title, her name and a PO box address in [place] as a contact. There were no other information as to which clinic in [place] this was from. Additionally, her mother also sent her a one page informational sheet on Stimate (nasal DDAVP) that was provided to her prior to her wisdom teeth extraction in Keely's Ghulam year. Keely recalls that she decided not to take the Stimate at the time and underwent her wisdom teeth extraction without any hematological treatments and from what she recalls did not have any bleeding complications. "     Keely reports that she underwent hernia repair surgery at age 4 but unclear if she did or did not have any bleeding complications. She recalls that her menarche was at age 14 or 15. For the first 1-2 years, she recalls that she had heavy menstrual bleeding and would leak to her underwear and clothing despite using tampons and pads for the first 1-2 days of her period. She recalls that she did miss some school days because of her menorrhagia. She eventually was placed on estrogen containing OCP in her Ghulam year or Senior year of high school. With estrogen containing OCP, her period was much lighter. Despite her history of menorrhagia, she was never told that she was anemic. She never needed any blood transfusions.     She denies any frequent epistaxis in the past or currently. She denies any oral mucosal bleeding with routine dental care. She denies any easy bruising in the past or now.     Keely stayed on estrogen containing OCP up until July 2024 when her and her partner were attempting pregnancy.     Currently, she is around 20 weeks pregnant. No complications. Denies any vaginal spotting or bleeding currently. She will have a baby boy and her estimated delivery date is 11/19/2025. She has not decided where she is going to deliver the baby as of yet. She lives in [place].     ROS:  Denies any bleeding complications. Specifically, no frequent epistaxis. No issues with oral mucosal bleeding. Denies any hematuria or blood in stools. Denies any shortness of breath. No chest pain. No cough. No fever.      Medications:  Current Outpatient Medications   Medication Sig Dispense Refill    AVIANE 0.1-20 MG-MCG tablet TAKE 1 TABLET BY MOUTH  DAILY 84 tablet 3    buPROPion (WELLBUTRIN XL) 150 MG 24 hr tablet TAKE 1 TABLET BY MOUTH IN  THE MORNING 90 tablet 2    CHOLECALCIFEROL, VITAMIN D3, ORAL [CHOLECALCIFEROL, VITAMIN D3, ORAL] Take by mouth daily. Unknown dose      citalopram (CELEXA) 20 MG tablet TAKE 1 TABLET  BY MOUTH  DAILY 90 tablet 3    FA/mv,Ca,iron,min/lycopene/lut (MULTIVITAL ORAL) [FA/MV,CA,IRON,MIN/LYCOPENE/LUT (MULTIVITAL ORAL)] Take by mouth.      ondansetron (ZOFRAN ODT) 4 MG ODT tab Take 1 tablet (4 mg) by mouth every 8 hours as needed for nausea or vomiting. 15 tablet 0    traZODone (DESYREL) 100 MG tablet TAKE 2 TABLETS BY MOUTH AT  BEDTIME 60 tablet 0     No current facility-administered medications for this visit.     Allergies:  Allergies   Allergen Reactions    Grass Extracts [Gramineae Pollens] Itching     Itchy eyes    Other Environmental Allergy Unknown     Other reaction(s): OTHER (explain in comments), Itchy eyes  Other reaction(s): OTHER (explain in comments)  Itchy eyes    Pollen Extracts [Pollen Extract] Itching     Itchy eyes    Penicillins Other (See Comments) and Rash     PmHx:  Past Medical History:   Diagnosis Date    HSIL (high grade squamous intraepithelial lesion) on Pap smear of cervix 11/24/2020 6/5/18 NIL pap 11/24/20 HSIL, +HR HPV (not 16/18). Plan: colposcopy due before 2/24/21       Social History:   Deferred    Family History:  She reports that both her father and her brother are diagnosed with Von Willebrand Disease in the past but she does not know much about their detail history. All of them currently lives in [facility].     Objective:  Vitals: /83 (BP Location: Right arm)   Pulse 84   Temp 98.7  F (37.1  C) (Tympanic)   Wt 68.8 kg (151 lb 9.6 oz)   SpO2 100%   BMI 24.47 kg/m    Exam:   Complete exam is not performed today.     Labs:  Component      Latest Ref [facility] 4/6/2025  10:28 PM   WBC      4.0 - 11.0 10e3/uL 12.1 (H)    RBC Count      3.80 - 5.20 10e6/uL 5.10    Hemoglobin      11.7 - 15.7 g/dL 14.9    Hematocrit      35.0 - 47.0 % 42.9    MCV      78 - 100 fL 84    MCH      26.5 - 33.0 pg 29.2    MCHC      31.5 - 36.5 g/dL 34.7    RDW      10.0 - 15.0 % 12.5    Platelet Count      150 - 450 10e3/uL 311    % Neutrophils      % 83    % Lymphocytes      %  10    % Monocytes      % 4    % Eosinophils      % 0    % Basophils      % 0    % Immature Granulocytes      % 2    NRBC/W      <1 /100 0    Absolute Neutrophil      1.6 - 8.3 10e3/uL 10.1 (H)    Absolute Lymphocytes      0.8 - 5.3 10e3/uL 1.2    Absolute Monocytes      0.0 - 1.3 10e3/uL 0.5    Absolute Eosinophils      0.0 - 0.7 10e3/uL 0.0    Absolute Basophils      0.0 - 0.2 10e3/uL 0.1    Absolute Immature Granulocytes      <=0.4 10e3/uL 0.3    Absolute NRBCs      10e3/uL 0.0    Sodium      135 - 145 mmol/L 136    Potassium      3.4 - 5.3 mmol/L 3.4    Carbon Dioxide (CO2)      22 - 29 mmol/L 27    Anion Gap      7 - 15 mmol/L 8    Urea Nitrogen      6.0 - 20.0 mg/dL 6.6    Creatinine      0.51 - 0.95 mg/dL 0.56    GFR Estimate      >60 mL/min/1.73m2 >90    Calcium      8.8 - 10.4 mg/dL 9.2    Chloride      98 - 107 mmol/L 101    Glucose      70 - 99 mg/dL 105 (H)    Alkaline Phosphatase      40 - 150 U/L 68    Alkaline Phosphatase      40 - 150 U/L 68    AST      0 - 45 U/L 18    AST      0 - 45 U/L 18    ALT      0 - 50 U/L 28    ALT      0 - 50 U/L 28    Protein Total      6.4 - 8.3 g/dL 7.4    Protein Total      6.4 - 8.3 g/dL 7.4    Albumin      3.5 - 5.2 g/dL 4.5    Albumin      3.5 - 5.2 g/dL 4.5    Bilirubin Total      <=1.2 mg/dL 1.3 (H)    Bilirubin Total      <=1.2 mg/dL 1.3 (H)    Bilirubin Direct      0.00 - 0.30 mg/dL 0.34 (H)       Assessment:  In summary, Keely is a 29 year old  female with a reported history of Von Willebrand Disease, who is referred by Dr. Juarez Harrison of [facility] at [facility], for consultation as she is currently at around 20 weeks pregnant with her first ever pregnancy.    Unfortunately, we are not able to find any records in regard to her diagnosis of Von Willebrand Disease. Keely herself also does not have readily available records about her diagnosis. We do not know when she was actually diagnosed with the  disease, under what circumstances that she was diagnosed (although reportedly her father and her 7 year older brother both have the disease), her baseline Von Willebrand factor levels or the type of Von Willebrand disease that she was diagnosed with. Despite all these unknown, based on her bleeding history / phenotype, she likely has mild disease and likely Type I Von Willebrand disease.     Diagnosis:  Reported history of Von Willebrand Disease.   Currently pregnant at 20 weeks gestation.   Family history of Von Willebrand Disease.     Plan:  Today, I spent quite a bit of time educating Hope on Von Willebrand Disease. I explain to her that based on her reported history, I suspect that she has mild type I Von Willebrand disease. Since she is currently pregnant, testing for Von Willebrand Disease is unreliable as pregnancy can increase baseline Von Willebrand factor levels due to estrogen affect. In fact, if she truly has mild type I Von Willebrand disease, we expect her Von Willebrand factor will continues to increase throughout the remainder of her pregnancy to the level where she will not likely need any specific hematological treatments for labor and delivery.     For the remainder of her pregnancy, the plan is as follow keeping in mind that we are not able to confirm or exclude the diagnosis of Von Willebrand Disease while she is pregnant:  Antepartum: I will check her Von Willebrand Antigen, Von Willebrand Activities and factor VIII level today. I will recheck her Von Willebrand Antigen, Von Willebrand Activities and factor VIII level when she is around 30-32 weeks gestation. No specific hematological treatments are necessary for the duration of the remainder of her pregnancy at this time.   Labor and Delivery: Final plan will depend on her Von Willebrand factor profile testing from today and at 30-32 weeks gestation. But preliminarily anticipating that her Von Willebrand factor profile shows all normal levels,  she will not need specific hematological treatments for her labor and delivery. We have to assume that her baby boy does have a 50% chance of having Von Willebrand Disease and thus we are recommending against any use of birth assisting devices such as forceps or suction.   Post partum: Recommend close monitoring for bleeding complications during her post partum period as her Von Willebrand levels will drop rapidly to her baseline levels (whatever that may be) shortly after her delivery. I will plan to see her back in clinic at 2 months postpartum and plan to repeat all her Von Willebrand labs including getting multimer analysis at that time.     She is given our clinic's contact information and is instructed to call if she should have any further questions or concerns.         Brendan Dunbar PA-C, MPAS  Physician Assistant  [facility].     The longitudinal plan of care for these conditions below were addressed during this visit. Due to the added complexity in care, I will continue to support Keely Looney  in the subsequent management of these conditions and with the ongoing continuity of care of these conditions.    Problem List Items Addressed This Visit as of 2/19/2024   Reported history of Von Willebrand Disease.   Currently pregnant at 20 weeks gestation.   Family history of Von Willebrand Disease.     70 minutes spent by me on the date of the encounter doing chart review, history and exam, documentation and further activities per the note.    Time IN: 14:12  Time OUT: 14:55

## 2025-07-01 ENCOUNTER — OFFICE VISIT (OUTPATIENT)
Dept: HEMATOLOGY | Facility: CLINIC | Age: 30
End: 2025-07-01
Attending: PHYSICIAN ASSISTANT
Payer: COMMERCIAL

## 2025-07-01 VITALS
WEIGHT: 151.6 LBS | OXYGEN SATURATION: 100 % | BODY MASS INDEX: 24.47 KG/M2 | HEART RATE: 84 BPM | SYSTOLIC BLOOD PRESSURE: 132 MMHG | DIASTOLIC BLOOD PRESSURE: 83 MMHG | TEMPERATURE: 98.7 F

## 2025-07-01 DIAGNOSIS — D68.00 VON WILLEBRAND'S DISEASE (H): Primary | ICD-10-CM

## 2025-07-01 DIAGNOSIS — Z3A.20 20 WEEKS GESTATION OF PREGNANCY: ICD-10-CM

## 2025-07-01 DIAGNOSIS — Z83.2 FAMILY HISTORY OF VON WILLEBRAND DISEASE: ICD-10-CM

## 2025-07-01 PROCEDURE — 3079F DIAST BP 80-89 MM HG: CPT | Performed by: PHYSICIAN ASSISTANT

## 2025-07-01 PROCEDURE — G2211 COMPLEX E/M VISIT ADD ON: HCPCS | Performed by: PHYSICIAN ASSISTANT

## 2025-07-01 PROCEDURE — 36415 COLL VENOUS BLD VENIPUNCTURE: CPT | Performed by: PHYSICIAN ASSISTANT

## 2025-07-01 PROCEDURE — 99205 OFFICE O/P NEW HI 60 MIN: CPT | Performed by: PHYSICIAN ASSISTANT

## 2025-07-01 PROCEDURE — 3075F SYST BP GE 130 - 139MM HG: CPT | Performed by: PHYSICIAN ASSISTANT

## 2025-07-01 PROCEDURE — 85246 CLOT FACTOR VIII VW ANTIGEN: CPT | Performed by: PHYSICIAN ASSISTANT

## 2025-07-01 PROCEDURE — 99213 OFFICE O/P EST LOW 20 MIN: CPT | Performed by: PHYSICIAN ASSISTANT

## 2025-07-01 PROCEDURE — 85240 CLOT FACTOR VIII AHG 1 STAGE: CPT | Performed by: PHYSICIAN ASSISTANT

## 2025-07-01 NOTE — PATIENT INSTRUCTIONS
[facility]  [facility]  [facility] [address], [place] [address]  Main: [phone], Fax: [phone]    It was a pleasure seeing you today.  Thank you for allowing us to be involved in your care.  Please let us know if there is anything else we can do for you, so that we can be sure you are leaving completely satisfied with your care experience.    Labs today.  Our lab is located within our clinic space.  We will communicate these to you by [employer]. With your permission we may leave a detailed voicemail, please see below for our contact information should you have any questions about your results. Also, please note that some lab results may take a week or so to get back. Feel free to call or message if you have not heard back from us within 7-10 days.       Please get repeat labs completed in mid-September.    For dental procedures, colonoscopy or endoscopy biopsies, heavy periods or ongoing nosebleeds, please call the center.  We may consider using  Amicar (aminocaproic acid) or Lysteda (transexamic acid).      If you would like further information about your bleeding disorder, the following websites may be of help:  The National Hemophilia Foundation (includes HANDI link for educational resources)   www.hemophilia.org  The World Federation of Hemophilia (includes Global Treatment Chambers Directory)   www.wfh.org  The Foundation for Women and Girls with Bleeding Disorders        www.fwgbd.org     If you have emergency needs in the evening or weekend, please call [phone] and ask for the hematologist on call.     Return to clinic 2 months after delivery.    Your nurse clinician is Marii Oneil RN, BSN, PCCN [phone].  If she is unavailable and you have immediate concerns, please call [phone] and ask for a nurse.

## 2025-07-01 NOTE — LETTER
"          [facility]  [address] [place] [id number]  Main: [phone], Fax: [phone]    Patient seen at: [facility] at [address] [place] [address]    Outpatient Visit Note:    Patient: Keely Looney  MRN: [id number]  : 1995  JUDSON: 2025  Location of this writer at the time of this clinic visit was conducted: [facility].  Location of the patient at the time of this clinic visit was conducted: [facility].     Reason for visit:  Von Willebrand Disease. Pregnant at 19 weeks gestation.     HPI:  Keely is a 29 year old  female with a reported history of Von Willebrand Disease, who is referred by Dr. Juarez Harrison of [facility], for consultation as she is currently at around 20 weeks pregnant with her first ever pregnancy.    We do not have any records in regard to her diagnosis of Von Willebrand Disease.     In speaking to Keely today, she was told that she has Von Willebrand Disease many years ago prior to her ghulam year in high school. At the time, she was residing in [facility]. Her mother sent her a picture via her mobile phone of a pocket size card that says \"[facility]\" on the title, her name and a PO box address in [place] as a contact. There were no other information as to which clinic in [place] this was from. Additionally, her mother also sent her a one page informational sheet on Stimate (nasal DDAVP) that was provided to her prior to her wisdom teeth extraction in Keely's Ghulam year. Keely recalls that she decided not to take the Stimate at the time and underwent her wisdom teeth extraction without any hematological treatments and from what she recalls did not have any bleeding complications. "     Keely reports that she underwent hernia repair surgery at age 4 but unclear if she did or did not have any bleeding complications. She recalls that her menarche was at age 14 or 15. For the first 1-2 years, she recalls that she had heavy menstrual bleeding and would leak to her underwear and clothing despite using tampons and pads for the first 1-2 days of her period. She recalls that she did miss some school days because of her menorrhagia. She eventually was placed on estrogen containing OCP in her Ghulam year or Senior year of high school. With estrogen containing OCP, her period was much lighter. Despite her history of menorrhagia, she was never told that she was anemic. She never needed any blood transfusions.     She denies any frequent epistaxis in the past or currently. She denies any oral mucosal bleeding with routine dental care. She denies any easy bruising in the past or now.     Keely stayed on estrogen containing OCP up until July 2024 when her and her partner were attempting pregnancy.     Currently, she is around 20 weeks pregnant. No complications. Denies any vaginal spotting or bleeding currently. She will have a baby boy and her estimated delivery date is 11/19/2025. She has not decided where she is going to deliver the baby as of yet. She lives in [place].     ROS:  Denies any bleeding complications. Specifically, no frequent epistaxis. No issues with oral mucosal bleeding. Denies any hematuria or blood in stools. Denies any shortness of breath. No chest pain. No cough. No fever.      Medications:  Current Outpatient Medications   Medication Sig Dispense Refill     AVIANE 0.1-20 MG-MCG tablet TAKE 1 TABLET BY MOUTH  DAILY 84 tablet 3     buPROPion (WELLBUTRIN XL) 150 MG 24 hr tablet TAKE 1 TABLET BY MOUTH IN  THE MORNING 90 tablet 2     CHOLECALCIFEROL, VITAMIN D3, ORAL [CHOLECALCIFEROL, VITAMIN D3, ORAL] Take by mouth daily. Unknown dose       citalopram (CELEXA) 20 MG tablet TAKE 1  TABLET BY MOUTH  DAILY 90 tablet 3     FA/mv,Ca,iron,min/lycopene/lut (MULTIVITAL ORAL) [FA/MV,CA,IRON,MIN/LYCOPENE/LUT (MULTIVITAL ORAL)] Take by mouth.       ondansetron (ZOFRAN ODT) 4 MG ODT tab Take 1 tablet (4 mg) by mouth every 8 hours as needed for nausea or vomiting. 15 tablet 0     traZODone (DESYREL) 100 MG tablet TAKE 2 TABLETS BY MOUTH AT  BEDTIME 60 tablet 0     No current facility-administered medications for this visit.     Allergies:  Allergies   Allergen Reactions     Grass Extracts [Gramineae Pollens] Itching     Itchy eyes     Other Environmental Allergy Unknown     Other reaction(s): OTHER (explain in comments), Itchy eyes  Other reaction(s): OTHER (explain in comments)  Itchy eyes     Pollen Extracts [Pollen Extract] Itching     Itchy eyes     Penicillins Other (See Comments) and Rash     PmHx:  Past Medical History:   Diagnosis Date     HSIL (high grade squamous intraepithelial lesion) on Pap smear of cervix 11/24/2020 6/5/18 NIL pap 11/24/20 HSIL, +HR HPV (not 16/18). Plan: colposcopy due before 2/24/21       Social History:   Deferred    Family History:  She reports that both her father and her brother are diagnosed with Von Willebrand Disease in the past but she does not know much about their detail history. All of them currently lives in [facility].     Objective:  Vitals: /83 (BP Location: Right arm)   Pulse 84   Temp 98.7  F (37.1  C) (Tympanic)   Wt 68.8 kg (151 lb 9.6 oz)   SpO2 100%   BMI 24.47 kg/m    Exam:   Complete exam is not performed today.     Labs:  Component      Latest Ref [facility] 4/6/2025  10:28 PM   WBC      4.0 - 11.0 10e3/uL 12.1 (H)    RBC Count      3.80 - 5.20 10e6/uL 5.10    Hemoglobin      11.7 - 15.7 g/dL 14.9    Hematocrit      35.0 - 47.0 % 42.9    MCV      78 - 100 fL 84    MCH      26.5 - 33.0 pg 29.2    MCHC      31.5 - 36.5 g/dL 34.7    RDW      10.0 - 15.0 % 12.5    Platelet Count      150 - 450 10e3/uL 311    % Neutrophils      % 83    %  Lymphocytes      % 10    % Monocytes      % 4    % Eosinophils      % 0    % Basophils      % 0    % Immature Granulocytes      % 2    NRBC/W      <1 /100 0    Absolute Neutrophil      1.6 - 8.3 10e3/uL 10.1 (H)    Absolute Lymphocytes      0.8 - 5.3 10e3/uL 1.2    Absolute Monocytes      0.0 - 1.3 10e3/uL 0.5    Absolute Eosinophils      0.0 - 0.7 10e3/uL 0.0    Absolute Basophils      0.0 - 0.2 10e3/uL 0.1    Absolute Immature Granulocytes      <=0.4 10e3/uL 0.3    Absolute NRBCs      10e3/uL 0.0    Sodium      135 - 145 mmol/L 136    Potassium      3.4 - 5.3 mmol/L 3.4    Carbon Dioxide (CO2)      22 - 29 mmol/L 27    Anion Gap      7 - 15 mmol/L 8    Urea Nitrogen      6.0 - 20.0 mg/dL 6.6    Creatinine      0.51 - 0.95 mg/dL 0.56    GFR Estimate      >60 mL/min/1.73m2 >90    Calcium      8.8 - 10.4 mg/dL 9.2    Chloride      98 - 107 mmol/L 101    Glucose      70 - 99 mg/dL 105 (H)    Alkaline Phosphatase      40 - 150 U/L 68    Alkaline Phosphatase      40 - 150 U/L 68    AST      0 - 45 U/L 18    AST      0 - 45 U/L 18    ALT      0 - 50 U/L 28    ALT      0 - 50 U/L 28    Protein Total      6.4 - 8.3 g/dL 7.4    Protein Total      6.4 - 8.3 g/dL 7.4    Albumin      3.5 - 5.2 g/dL 4.5    Albumin      3.5 - 5.2 g/dL 4.5    Bilirubin Total      <=1.2 mg/dL 1.3 (H)    Bilirubin Total      <=1.2 mg/dL 1.3 (H)    Bilirubin Direct      0.00 - 0.30 mg/dL 0.34 (H)       Assessment:  In summary, Keely is a 29 year old  female with a reported history of Von Willebrand Disease, who is referred by Dr. Juarez Harrison of [facility] at [facility], for consultation as she is currently at around 20 weeks pregnant with her first ever pregnancy.    Unfortunately, we are not able to find any records in regard to her diagnosis of Von Willebrand Disease. Keely herself also does not have readily available records about her diagnosis. We do not know when she was actually  diagnosed with the disease, under what circumstances that she was diagnosed (although reportedly her father and her 7 year older brother both have the disease), her baseline Von Willebrand factor levels or the type of Von Willebrand disease that she was diagnosed with. Despite all these unknown, based on her bleeding history / phenotype, she likely has mild disease and likely Type I Von Willebrand disease.     Diagnosis:  Reported history of Von Willebrand Disease.   Currently pregnant at 20 weeks gestation.   Family history of Von Willebrand Disease.     Plan:  Today, I spent quite a bit of time educating Hope on Von Willebrand Disease. I explain to her that based on her reported history, I suspect that she has mild type I Von Willebrand disease. Since she is currently pregnant, testing for Von Willebrand Disease is unreliable as pregnancy can increase baseline Von Willebrand factor levels due to estrogen affect. In fact, if she truly has mild type I Von Willebrand disease, we expect her Von Willebrand factor will continues to increase throughout the remainder of her pregnancy to the level where she will not likely need any specific hematological treatments for labor and delivery.     For the remainder of her pregnancy, the plan is as follow keeping in mind that we are not able to confirm or exclude the diagnosis of Von Willebrand Disease while she is pregnant:  Antepartum: I will check her Von Willebrand Antigen, Von Willebrand Activities and factor VIII level today. I will recheck her Von Willebrand Antigen, Von Willebrand Activities and factor VIII level when she is around 30-32 weeks gestation. No specific hematological treatments are necessary for the duration of the remainder of her pregnancy at this time.   Labor and Delivery: Final plan will depend on her Von Willebrand factor profile testing from today and at 30-32 weeks gestation. But preliminarily anticipating that her Von Willebrand factor profile shows  all normal levels, she will not need specific hematological treatments for her labor and delivery. We have to assume that her baby boy does have a 50% chance of having Von Willebrand Disease and thus we are recommending against any use of birth assisting devices such as forceps or suction.   Post partum: Recommend close monitoring for bleeding complications during her post partum period as her Von Willebrand levels will drop rapidly to her baseline levels (whatever that may be) shortly after her delivery. I will plan to see her back in clinic at 2 months postpartum and plan to repeat all her Von Willebrand labs including getting multimer analysis at that time.     She is given our clinic's contact information and is instructed to call if she should have any further questions or concerns.         Brendan Dunbar PA-C, MPAS  Physician Assistant  [facility].     The longitudinal plan of care for these conditions below were addressed during this visit. Due to the added complexity in care, I will continue to support Keely Looney  in the subsequent management of these conditions and with the ongoing continuity of care of these conditions.    Problem List Items Addressed This Visit as of 2/19/2024   Reported history of Von Willebrand Disease.   Currently pregnant at 20 weeks gestation.   Family history of Von Willebrand Disease.     70 minutes spent by me on the date of the encounter doing chart review, history and exam, documentation and further activities per the note.    Time IN: 14:12  Time OUT: 14:55

## 2025-07-03 ENCOUNTER — RESULTS FOLLOW-UP (OUTPATIENT)
Dept: HEMATOLOGY | Facility: CLINIC | Age: 30
End: 2025-07-03

## 2025-07-03 LAB
FACT VIII ACT/NOR PPP: 148 % (ref 55–200)
VWF AG ACT/NOR PPP IA: 128 % (ref 50–200)
VWF:AC ACT/NOR PPP IA: 116 % (ref 50–180)

## 2025-07-07 ENCOUNTER — TELEPHONE (OUTPATIENT)
Dept: HEMATOLOGY | Facility: CLINIC | Age: 30
End: 2025-07-07
Payer: COMMERCIAL

## 2025-07-07 LAB — VWF:AC ACT/NOR PPP IA: 116 % (ref 50–180)

## 2025-07-07 NOTE — TELEPHONE ENCOUNTER
[id number]  Keely JON Looney  29 year old female    Incoming call from [facility]. She realizes she had a hx of platelet defect instead of Von Willebrand Disease (what she believed she had a history of when she came in for appt). She is currently 20 weeks pregnant. She saw Brendan Dunbar PA-C on 7/1/25. Her VW labs were normal, plan had been to repeat VW labs at 30 to 32 weeks gestation.    Keely and her mother have since gotten in touch with [facility]'s pediatric hematologist. She sent below records (will be uploaded to HIM) to our team. Brendan Dunbar PA-C is out of the office this week. Will route to provider team to review and make immediate changes to the plan if needed. Otherwise, will touch base with Brendan when he is back next week to reassess plan in light of new findings.      Marii Oneil RN, BSN, PCCN  Nurse Clinician    [facility]  [address] [place] [address], [address], [place] [address]   Office, direct: [phone]  Main office number: [phone]  Pronouns: She, her, hers

## 2025-07-07 NOTE — TELEPHONE ENCOUNTER
Request from Azul Valdez PA-C to call Dr. Abdi's office and see if they have a clearer set of past labs and DDAVP Trial. Called [phone] and left a VM. Will monitor for call back.    Addendum 7/9/2025 9:25 AM:  RN called Dr. Abdi's office. Spoke with Caryl MORRISON. No DDAVP trial information on file. She will send over note with platelet aggregation results and anything else they can find.    Marii Oneil RN, BSN, PCCN  Nurse Clinician    [facility]  [address] [place] [address], [address], [place] [address]   Office, direct: [phone]  Main office number: [phone]  Pronouns: She, her, hers

## 2025-07-08 LAB — VWF:RCO ACT/NOR PPP PL AGG: 124 %

## 2025-07-14 ENCOUNTER — MYC MEDICAL ADVICE (OUTPATIENT)
Dept: HEMATOLOGY | Facility: CLINIC | Age: 30
End: 2025-07-14
Payer: COMMERCIAL

## 2025-07-14 DIAGNOSIS — Z83.2 FAMILY HISTORY OF VON WILLEBRAND DISEASE: ICD-10-CM

## 2025-07-14 DIAGNOSIS — D69.1 PLATELET DYSFUNCTION (H): ICD-10-CM

## 2025-07-14 DIAGNOSIS — D68.00 VON WILLEBRAND'S DISEASE (H): Primary | ICD-10-CM

## 2025-07-28 ENCOUNTER — OFFICE VISIT (OUTPATIENT)
Dept: FAMILY MEDICINE | Facility: CLINIC | Age: 30
End: 2025-07-28
Payer: COMMERCIAL

## 2025-07-28 VITALS
DIASTOLIC BLOOD PRESSURE: 78 MMHG | OXYGEN SATURATION: 100 % | WEIGHT: 159.1 LBS | RESPIRATION RATE: 12 BRPM | TEMPERATURE: 97.6 F | HEART RATE: 83 BPM | HEIGHT: 66 IN | BODY MASS INDEX: 25.57 KG/M2 | SYSTOLIC BLOOD PRESSURE: 120 MMHG

## 2025-07-28 DIAGNOSIS — Z00.01 ENCOUNTER FOR ROUTINE ADULT MEDICAL EXAM WITH ABNORMAL FINDINGS: Primary | ICD-10-CM

## 2025-07-28 DIAGNOSIS — L72.3 SEBACEOUS CYST: ICD-10-CM

## 2025-07-28 DIAGNOSIS — Z98.890 H/O LEEP: ICD-10-CM

## 2025-07-28 DIAGNOSIS — L08.9 INFECTED SEBACEOUS CYST OF SKIN: ICD-10-CM

## 2025-07-28 DIAGNOSIS — Z3A.23 23 WEEKS GESTATION OF PREGNANCY: ICD-10-CM

## 2025-07-28 DIAGNOSIS — L72.3 INFECTED SEBACEOUS CYST OF SKIN: ICD-10-CM

## 2025-07-28 DIAGNOSIS — Z12.4 CERVICAL CANCER SCREENING: ICD-10-CM

## 2025-07-28 DIAGNOSIS — D68.00 VON WILLEBRAND DISEASE (H): ICD-10-CM

## 2025-07-28 PROBLEM — F33.0 MILD RECURRENT MAJOR DEPRESSION: Status: ACTIVE | Noted: 2020-03-06

## 2025-07-28 SDOH — HEALTH STABILITY: PHYSICAL HEALTH: ON AVERAGE, HOW MANY DAYS PER WEEK DO YOU ENGAGE IN MODERATE TO STRENUOUS EXERCISE (LIKE A BRISK WALK)?: 5 DAYS

## 2025-07-28 ASSESSMENT — PATIENT HEALTH QUESTIONNAIRE - PHQ9
SUM OF ALL RESPONSES TO PHQ QUESTIONS 1-9: 10
10. IF YOU CHECKED OFF ANY PROBLEMS, HOW DIFFICULT HAVE THESE PROBLEMS MADE IT FOR YOU TO DO YOUR WORK, TAKE CARE OF THINGS AT HOME, OR GET ALONG WITH OTHER PEOPLE: SOMEWHAT DIFFICULT
SUM OF ALL RESPONSES TO PHQ QUESTIONS 1-9: 10

## 2025-07-28 ASSESSMENT — SOCIAL DETERMINANTS OF HEALTH (SDOH): HOW OFTEN DO YOU GET TOGETHER WITH FRIENDS OR RELATIVES?: ONCE A WEEK

## 2025-07-28 NOTE — PATIENT INSTRUCTIONS
"Patient Education   Preventive Care Advice   This is general advice we often give to help people stay healthy. Your care team may have specific advice just for you. Please talk to your care team about your own preventive care needs.  Lifestyle  Exercise at least 150 minutes each week (30 minutes a day, 5 days a week).  Do muscle strengthening activities 2 days a week. These help control your weight and prevent disease.  No smoking.  Wear sunscreen to prevent skin cancer.  Take time with family and friends.  Have your home tested for radon every 2 to 5 years. Radon is a colorless, odorless gas that can harm your lungs. To learn more, go to www.health.[facility].mn.us and search for \"Radon in Homes.\"  Keep guns unloaded and locked up in a safe place like a safe or gun vault, or, use a gun lock and hide the keys. Always lock away bullets separately. To learn more, visit [employer].[place].gov and search for \"safe gun storage.\"  Nutrition  Eat 5 or more servings of fruits and vegetables each day.  Try wheat bread, brown rice and whole grain pasta (instead of white bread, rice, and pasta).  Get enough calcium and vitamin D. Check the label on foods and aim for 100% of the RDA (recommended daily allowance).  Regular exams  Have a dental exam and cleaning every 6 months.  Older adults: Ask your care team how often to have memory testing.  See your health care team every year to talk about:  Any changes in your health.  Any medicines your care team has prescribed.  Preventive care, family planning, and ways to prevent chronic diseases.  Shots (vaccines)   HPV shots (up to age 26), if you've never had them before.  Hepatitis B shots (up to age 59), if you've never had them before.  COVID-19 shot: Get this shot when it's due.  Flu shot: Get a flu shot every year.  Tetanus shot: Get a tetanus shot every 10 years.  Pneumococcal, hepatitis A, and RSV shots: Ask your care team if you need these based on your risk.  Shingles shot (for age 50 and " up).  General health tests  Diabetes screening:  Starting at age 35, Get screened for diabetes at least every 3 years.  If you are younger than age 35, ask your care team if you should be screened for diabetes.  Cholesterol test: At age 39, start having a cholesterol test every 5 years, or more often if advised.  Bone density scan (DEXA): At age 50, ask your care team if you should have this scan for osteoporosis (brittle bones).  Hepatitis C: Get tested at least once in your life.  Abdominal aortic aneurysm screening: Talk to your doctor about having this screening if you:  Have ever smoked; and  Are biologically male; and  Are between the ages of 65 and 75.  STIs (sexually transmitted infections)  Before age 24: Ask your care team if you should be screened for STIs.  After age 24: Get screened for STIs if you're at risk. You are at risk for STIs (including HIV) if:  You are sexually active with more than one person.  You don't use condoms every time.  You or a partner was diagnosed with a sexually transmitted infection.  If you are at risk for HIV, ask about PrEP medicine to prevent HIV.  Get tested for HIV at least once in your life, whether you are at risk for HIV or not.  Cancer screening tests  Cervical cancer screening: If you have a cervix, begin getting regular cervical cancer screening tests at age 21. Most people who have regular screenings with normal results can stop after age 65. Talk about this with your provider.  Breast cancer scan (mammogram): If you've ever had breasts, begin having regular mammograms starting at age 40. This is a scan to check for breast cancer.  Colon cancer screening: It is important to start screening for colon cancer at age 45.  Have a colonoscopy test every 10 years (or more often if you're at risk) Or, ask your provider about stool tests like a FIT test every year or Cologuard test every 3 years.  To learn more about your testing options, visit:  www.v2tel/833127.pdf.  For help making a decision, visit: maylin.garry/cn49708.  Prostate cancer screening test: If you have a prostate and are age 55 to 69, ask your provider if you would benefit from a yearly prostate cancer screening test.  Lung cancer screening: If you are a current or former smoker age 50 to 80, ask your care team if ongoing lung cancer screenings are right for you.    For informational purposes only. Not to replace the advice of your health care provider. Copyright   2023 [facility] [employer]. All rights reserved. Clinically reviewed by the [facility]. VERTILAS [id number] - REV 6/25.  Learning About Stress  What is stress?     Stress is your body's response to a hard situation. Your body can have a physical, emotional, or mental response. Stress is a fact of life for most people, and it affects everyone differently. What causes stress for you may not be stressful for someone else.  A lot of things can cause stress. You may feel stress when you go on a job interview, take a test, or run a race. This kind of short-term stress is normal and even useful. It can help you if you need to work hard or react quickly. For example, stress can help you finish an important job on time.  Long-term stress is caused by ongoing stressful situations or events. Examples of long-term stress include long-term health problems, ongoing problems at work, or conflicts in your family. Long-term stress can harm your health.  How does stress affect your health?  When you are stressed, your body responds as though you are in danger. It makes hormones that speed up your heart, make you breathe faster, and give you a burst of energy. This is called the fight-or-flight stress response. If the stress is over quickly, your body goes back to normal and no harm is done.  But if stress happens too often or lasts too long, it can have bad effects. Long-term stress can make you more likely to get sick,  and it can make symptoms of some diseases worse. If you tense up when you are stressed, you may develop neck, shoulder, or low back pain. Stress is linked to high blood pressure and heart disease.  Stress also harms your emotional health. It can make you gonzalez, tense, or depressed. Your relationships may suffer, and you may not do well at work or school.  What can you do to manage stress?  You can try these things to help manage stress:   Do something active. Exercise or activity can help reduce stress. Walking is a great way to get started. Even everyday activities such as housecleaning or yard work can help.  Try yoga or hiro chi. These techniques combine exercise and meditation. You may need some training at first to learn them.  Do something you enjoy. For example, listen to music or go to a movie. Practice your hobby or do volunteer work.  Meditate. This can help you relax, because you are not worrying about what happened before or what may happen in the future.  Do guided imagery. Imagine yourself in any setting that helps you feel calm. You can use online videos, books, or a teacher to guide you.  Do breathing exercises. For example:  From a standing position, bend forward from the waist with your knees slightly bent. Let your arms dangle close to the floor.  Breathe in slowly and deeply as you return to a standing position. Roll up slowly and lift your head last.  Hold your breath for just a few seconds in the standing position.  Breathe out slowly and bend forward from the waist.  Let your feelings out. Talk, laugh, cry, and express anger when you need to. Talking with supportive friends or family, a counselor, or a victor m leader about your feelings is a healthy way to relieve stress. Avoid discussing your feelings with people who make you feel worse.  Write. It may help to write about things that are bothering you. This helps you find out how much stress you feel and what is causing it. When you know this,  "you can find better ways to cope.  What can you do to prevent stress?  You might try some of these things to help prevent stress:  Manage your time. This helps you find time to do the things you want and need to do.  Get enough sleep. Your body recovers from the stresses of the day while you are sleeping.  Get support. Your family, friends, and community can make a difference in how you experience stress.  Limit your news feed. Avoid or limit time on social media or news that may make you feel stressed.  Do something active. Exercise or activity can help reduce stress. Walking is a great way to get started.  Where can you learn more?  Go to https://www.[employer].me.net/patiented  Enter N032 in the search box to learn more about \"Learning About Stress.\"  Current as of: October 24, 2024  Content Version: 14.5    [id number] [employer].   Care instructions adapted under license by your healthcare professional. If you have questions about a medical condition or this instruction, always ask your healthcare professional. [employer] disclaims any warranty or liability for your use of this information.    Recovering From Depression: Care Instructions  Overview    Sticking to your treatment plan is important as you recover from depression. It may take time for your symptoms to get better after you start treatment. Try not to give up if you don't feel better right away. Make sure you keep going to counseling and taking any prescribed medicine if they are part of your treatment plan.  Focus on things that can help you feel better, such as being with friends and family. Try to eat healthy foods, be active, and get enough sleep. Take things slowly as you begin to recover.  Follow-up care is a key part of your treatment and safety. Be sure to make and go to all appointments, and call your doctor if you are having problems. It's also a good idea to know your test results and keep a list of the medicines you " take.  How can you care for yourself at home?  Be realistic  If you have a large task to do, break it up into smaller steps you can handle, and just do what you can.  You may want to put off important decisions until your depression has lifted. If you have plans that will have a major impact on your life, such as marriage, divorce, or a job change, try to wait a bit. Talk it over with friends and loved ones who can help you look at the overall picture first.  Reaching out to people for help is important. Do not isolate yourself. Let your family and friends help you. Find someone you can trust and confide in, and talk to that person.  Be patient, and be kind to yourself. Remember that depression is not your fault and is not something you can overcome with willpower alone. Treatment is important for depression, just like for any other illness. Feeling better takes time, and your mood will improve little by little.  Stay active  Stay busy and get outside. Take a walk, or try some other light exercise.  Talk with your doctor about an exercise program. Exercise can help with mild depression.  Go to a movie or concert. Take part in a Hindu activity or other social gathering. Go to a ball game.  Ask a friend to have dinner with you.  Take care of yourself  Eat healthy foods such as fresh fruits and vegetables, whole grains, and lean protein. If you have lost your appetite, eat small snacks rather than large meals.  Avoid using marijuana and other drugs and drinking alcohol. Do not take medicines that have not been prescribed for you. They may interfere with medicines you may be taking for depression, or they may make your depression worse.  Take your medicines exactly as they are prescribed. You may start to feel better within 1 to 3 weeks of taking antidepressant medicine. But it can take as many as 6 to 8 weeks to see more improvement. If you have questions or concerns about your medicines, or if you do not notice any  improvement by 3 weeks, talk to your doctor.  Continue to take your medicine after your symptoms improve. Taking your medicine for at least 6 months after you feel better can help keep you from getting depressed again. If this isn't the first time you have been depressed, your doctor may recommend you to take medicine even longer.  If you have any side effects from your medicine, tell your doctor. Many side effects are mild and will go away on their own after you have been taking the medicine for a few weeks. Some may last longer. Talk to your doctor if side effects are bothering you too much. You might be able to try a different medicine.  Continue counseling. It may help prevent depression from returning, especially if you've had multiple episodes of depression. Talk with your counselor if you are having a hard time attending your sessions or you think the sessions aren't working. Don't just stop going.  Get enough sleep. Talk to your doctor if you are having problems sleeping.  Avoid sleeping pills unless they are prescribed by the doctor treating your depression. Sleeping pills may make you groggy during the day, and they may interact with other medicine you are taking.  If you have any other illnesses, such as diabetes, heart disease, or high blood pressure, make sure to continue with your treatment. Tell your doctor about all of the medicines you take, including those with or without a prescription.  Where to get help 24 hours a day, 7 days a week  If you or someone you know talks about suicide, self-harm, a mental health crisis, a substance use crisis, or any other kind of emotional distress, get help right away. You can:  Call the Suicide and Crisis Lifeline at 612.  Text HOME to 154686 to access the Crisis Text Line.  Consider saving these numbers in your phone.  Go to [employer].org for more information or to chat online.  Call 021 anytime you think you may need emergency care. For example, call if:  You  "feel like hurting yourself or someone else.  Someone you know has depression and is about to attempt or is attempting suicide.  Where to get help 24 hours a day, 7 days a week  If you or someone you know talks about suicide, self-harm, a mental health crisis, a substance use crisis, or any other kind of emotional distress, get help right away. You can:  Call the Suicide and Crisis Lifeline at 988.  Text HOME to 006977 to access the Crisis Text Line.  Consider saving these numbers in your phone.  Go to sportif225 for more information or to chat online.  Call your doctor now or seek immediate medical care if:  You hear voices.  Someone you know has depression and:  Starts to give away possessions.  Uses illegal drugs or drinks alcohol heavily.  Talks or writes about death, including writing suicide notes or talking about guns, knives, or pills.  Starts to spend a lot of time alone.  Acts very aggressively or suddenly appears calm.  Watch closely for changes in your health, and be sure to contact your doctor if:  You do not get better as expected.  Where can you learn more?  Go to https://www.[employer].net/patiented  Enter N529 in the search box to learn more about \"Recovering From Depression: Care Instructions.\"  Current as of: July 31, 2024  Content Version: 14.5 2024-2025 [employer].   Care instructions adapted under license by your healthcare professional. If you have questions about a medical condition or this instruction, always ask your healthcare professional. [employer] disclaims any warranty or liability for your use of this information.       "

## 2025-07-28 NOTE — PROGRESS NOTES
Preventive Care Visit  [facility] MADY Deras MD, Family Medicine  Jul 28, 2025      Assessment & Plan     Infected sebaceous cyst of skin:  - Cyst is severely infected, requiring drainage.  - Drain cyst and pack with a wick to facilitate drainage. Apply loose gauze dressing. Recommend taking Tylenol for pain relief.  repacking  of the  cavity will not be needed Showering is permissible after one day. Warm compresses and bacitracin or antibiotic ointment can be applied after cleaning on the cyst in left groin area , not fluctuant yet to drain    Sebaceous cyst:  - Monitor cyst in the groin area; not ready for drainage unless it enlarges.  - Monitor for changes in size. If it enlarges, drainage may be necessary.    Preventative care:  - Discussed routine prenatal screening, including upcoming 1-hour glucose test for gestational diabetes with her OB at [facility]. Option to check cholesterol after delivery. No additional blood work performed today as patient will have routine labs with prenatal care.    Consent was obtained from the patient to use an AI documentation tool in the creation of this note.    Sebaceous Cyst Excision drainage  Procedure Note      Pre-operative Diagnosis: infected sebaceous cyst      Post-operative Diagnosis: same      Locations:base of the neck /b/w upper end of scapula blade        Indications: infected /painful      Anesthesia: 2% lidocaine with epi       Procedure Details   History of allergy to iodine: no      Patient informed of the risks (including bleeding and infection) and benefits of the   procedure and verbal informed consent obtained.      The lesion and surrounding area was given a sterile prep using betadine  and draped in the usual sterile fashion. An incision was made over the cyst, which was dissected free of the surrounding tissue and removed.  The cyst was filled with typical purulent drainage with sebaceous material.   The patient  "tolerated the procedure well.  Cavity packed with curity packing strip and dressed with loose luisana and tap to help continue drain the cyst     Condition:  Stable      Complications:  none.      Plan:  1. Instructed to keep the wound dry and covered for 24-48h and clean thereafter.  2. Warning signs of infection were reviewed.    3. Recommended that the patient use OTC acetaminophen as needed for pain.                The longitudinal plan of care for the diagnosis(es)/condition(s) as documented were addressed during this visit. Due to the added complexity in care, I will continue to support Hope in the subsequent management and with ongoing continuity of care.     Patient has been advised of split billing requirements and indicates understanding: Yes    BMI  Estimated body mass index is 25.68 kg/m  as calculated from the following:    Height as of this encounter: 1.676 m (5' 6\").    Weight as of this encounter: 72.2 kg (159 lb 1.6 oz).   Weight management plan: Discussed healthy diet and exercise guidelines    Counseling  Appropriate preventive services were addressed with this patient via screening, questionnaire, or discussion as appropriate for fall prevention, nutrition, physical activity, Tobacco-use cessation, social engagement, weight loss and cognition.  Checklist reviewing preventive services available has been given to the patient.  Reviewed patient's diet, addressing concerns and/or questions.   The patient was instructed to see the dentist every 6 months.   She is at risk for psychosocial distress and has been provided with information to reduce risk.   Reviewed preventive health counseling, as reflected in patient instructions    Follow-up       Subjective   Keely is a 30 year old, presenting for the following:  Physical (Annual px. Cyst like lump on back of neck and groin area. )        7/28/2025    10:40 AM   Additional Questions   Roomed by Simi HO MA          Healthy Habits:     Taking medications " regularly:  0  History of Present Illness       Reason for visit:  Cyst like lump on back and groin  Symptom onset:  1-2 weeks ago   She is taking medications regularly.    Keely Looney, 30 years    Back Cyst  - Noticed a cyst on the neck area about one week prior to the visit  - Initially thought it was a pimple, but it continued to grow in size over the week  - No prior history of similar cysts  - No mention of pain, fever, or other systemic symptoms prior to the visit  - No prior treatment attempted before the encounter    Groin Lump  - Noted a lump in the groin area  - No drainage or intervention performed prior to the visit  - No mention of pain, redness, or other symptoms prior to the visit    Pregnancy  - First pregnancy, 23w5d followed at [facility] and [facility] , [facility] moving well    Bleeding disorder: Patient is following hematologist next week  -   Advance Care Planning    Discussed advance care planning with patient; informed AVS has link to [employer].        7/28/2025   General Health   How would you rate your overall physical health? Good   Feel stress (tense, anxious, or unable to sleep) Very much   (!) STRESS CONCERN      7/28/2025   Nutrition   Three or more servings of calcium each day? Yes   Diet: Vegetarian/vegan   How many servings of fruit and vegetables per day? (!) 2-3   How many sweetened beverages each day? 0-1         7/28/2025   Exercise   Days per week of moderate/strenous exercise 5 days         7/28/2025   Social Factors   Frequency of gathering with friends or relatives Once a week   Worry food won't last until get money to buy more No   Food not last or not have enough money for food? No   Do you have housing? (Housing is defined as stable permanent housing and does not include staying outside in a car, in a tent, in an abandoned building, in an overnight shelter, or couch-surfing.) Yes   Are you worried about losing your housing? No   Lack of transportation? No   Unable to get  utilities (heat,electricity)? No         2025   Dental   Dentist two times every year? (!) NO       Today's PHQ-9 Score:       2025    10:15 AM   PHQ-9 SCORE   PHQ-9 Total Score MyChart 10 (Moderate depression)   PHQ-9 Total Score 10        Patient-reported         2025   Substance Use   Alcohol more than 3/day or more than 7/wk No   Do you use any other substances recreationally? No     Social History     Tobacco Use    Smoking status: Never     Passive exposure: Never    Smokeless tobacco: Never   Vaping Use    Vaping status: Never Used   Substance Use Topics    Alcohol use: Yes     Comment: Casually on Weekends    Drug use: Never          Mammogram Screening - Patient under 40 years of age: Routine Mammogram Screening not recommended.         2025   STI Screening   New sexual partner(s) since last STI/HIV test? No     History of abnormal Pap smear: No - age 21-29 PAP every 3 years recommended        Latest Ref Rng & Units 2020    10:16 AM   PAP / HPV   PAP Negative for squamous intraepithelial lesion or malignancy. HSIL encompassing mod/severe dysplasia, CIS, CIN2, CIN3  Electronically signed by Genaro Truong MD on 12/3/2020 at  5:28 PM      HPV 16 DNA NEG Negative    HPV 18 DNA NEG Negative    Other HR HPV NEG Positive            2025   Contraception/Family Planning   Questions about contraception or family planning No   What are your periods like? (!) HEAVY FLOW        Reviewed and updated as needed this visit by Provider                    OB History    Para Term  AB Living   1 0 0 0 0 0   SAB IAB Ectopic Multiple Live Births   0 0 0 0 0      # Outcome Date GA Lbr Florin/2nd Weight Sex Type Anes PTL Lv   1 Current                  Review of Systems  Constitutional, neuro, ENT, endocrine, pulmonary, cardiac, gastrointestinal, genitourinary, musculoskeletal, integument and psychiatric systems are negative, except as otherwise noted.     Objective    Exam  BP  "120/78   Pulse 83   Temp 97.6  F (36.4  C) (Temporal)   Resp 12   Ht 1.676 m (5' 6\")   Wt 72.2 kg (159 lb 1.6 oz)   SpO2 100%   BMI 25.68 kg/m     Estimated body mass index is 25.68 kg/m  as calculated from the following:    Height as of this encounter: 1.676 m (5' 6\").    Weight as of this encounter: 72.2 kg (159 lb 1.6 oz).    Physical Exam  GENERAL: alert and no distress  EYES: Eyes grossly normal to inspection, PERRL and conjunctivae and sclerae normal  NECK: no adenopathy, no asymmetry, masses, or scars  RESP: lungs clear to auscultation - no rales, rhonchi or wheezes  CV: regular rate and rhythm, normal S1 S2, no S3 or S4, no murmur, click or rub, no peripheral edema  MS: no gross musculoskeletal defects noted, no edema  SKIN: Infected sebaceous cyst on the lower neck/between upper and scapula blade almost golf ball size        Signed Electronically by: Gris Deras MD    "

## 2025-08-13 ENCOUNTER — MYC MEDICAL ADVICE (OUTPATIENT)
Dept: HEMATOLOGY | Facility: CLINIC | Age: 30
End: 2025-08-13

## 2025-08-13 ENCOUNTER — LAB (OUTPATIENT)
Dept: LAB | Facility: CLINIC | Age: 30
End: 2025-08-13
Payer: COMMERCIAL

## 2025-08-13 DIAGNOSIS — Z3A.20 20 WEEKS GESTATION OF PREGNANCY: ICD-10-CM

## 2025-08-13 DIAGNOSIS — D68.00 VON WILLEBRAND'S DISEASE (H): ICD-10-CM

## 2025-08-13 DIAGNOSIS — D69.1 PLATELET DYSFUNCTION (H): ICD-10-CM

## 2025-08-13 DIAGNOSIS — Z83.2 FAMILY HISTORY OF VON WILLEBRAND DISEASE: ICD-10-CM

## 2025-08-13 LAB
PA AA BLD-ACNC: 77 %
PA ADP BLD-ACNC: 1.92 NM
PA ADP BLD-ACNC: 82 %
PA COLL PRP QL: 71 %
PA EPINEPH PRP QL: 80 %
PA RIST PRP QL: 4 %
PA RIST PRP QL: 68 %
PA RIST PRP QL: 78 %
PA RIST PRP QL: NORMAL
PA THROMBIN PRP: 1.74 NM

## 2025-08-13 PROCEDURE — 85576 BLOOD PLATELET AGGREGATION: CPT | Mod: 26 | Performed by: PATHOLOGY

## 2025-08-13 PROCEDURE — 36415 COLL VENOUS BLD VENIPUNCTURE: CPT | Performed by: PATHOLOGY

## 2025-08-13 PROCEDURE — 85576 BLOOD PLATELET AGGREGATION: CPT | Performed by: PATHOLOGY

## (undated) DEVICE — FORCEP BIOPSY DISP 000386

## (undated) DEVICE — SOL WATER IRRIG 1000ML BOTTLE 2F7114

## (undated) DEVICE — SUCTION MANIFOLD NEPTUNE 2 SYS 1 PORT 702-025-000

## (undated) DEVICE — TUBING SUCTION MEDI-VAC 1/4"X20' N620A - HE

## (undated) RX ORDER — LIDOCAINE HYDROCHLORIDE 10 MG/ML
INJECTION, SOLUTION EPIDURAL; INFILTRATION; INTRACAUDAL; PERINEURAL
Status: DISPENSED
Start: 2022-02-08

## (undated) RX ORDER — ONDANSETRON 2 MG/ML
INJECTION INTRAMUSCULAR; INTRAVENOUS
Status: DISPENSED
Start: 2022-02-08

## (undated) RX ORDER — PROPOFOL 10 MG/ML
INJECTION, EMULSION INTRAVENOUS
Status: DISPENSED
Start: 2022-02-08